# Patient Record
Sex: FEMALE | Race: WHITE | NOT HISPANIC OR LATINO | Employment: FULL TIME | URBAN - METROPOLITAN AREA
[De-identification: names, ages, dates, MRNs, and addresses within clinical notes are randomized per-mention and may not be internally consistent; named-entity substitution may affect disease eponyms.]

---

## 2019-04-10 LAB — HCV AB SER-ACNC: NEGATIVE

## 2022-08-31 ENCOUNTER — OFFICE VISIT (OUTPATIENT)
Dept: FAMILY MEDICINE CLINIC | Facility: CLINIC | Age: 62
End: 2022-08-31
Payer: COMMERCIAL

## 2022-08-31 ENCOUNTER — TELEPHONE (OUTPATIENT)
Dept: ADMINISTRATIVE | Facility: OTHER | Age: 62
End: 2022-08-31

## 2022-08-31 VITALS
HEART RATE: 75 BPM | WEIGHT: 159 LBS | BODY MASS INDEX: 28.17 KG/M2 | TEMPERATURE: 98.2 F | OXYGEN SATURATION: 98 % | DIASTOLIC BLOOD PRESSURE: 74 MMHG | SYSTOLIC BLOOD PRESSURE: 120 MMHG | HEIGHT: 63 IN

## 2022-08-31 DIAGNOSIS — M79.671 RIGHT FOOT PAIN: Primary | ICD-10-CM

## 2022-08-31 DIAGNOSIS — M77.41 METATARSALGIA, RIGHT FOOT: ICD-10-CM

## 2022-08-31 PROCEDURE — MEDROL 4MG MEDROL 4MG: Performed by: STUDENT IN AN ORGANIZED HEALTH CARE EDUCATION/TRAINING PROGRAM

## 2022-08-31 PROCEDURE — 99203 OFFICE O/P NEW LOW 30 MIN: CPT | Performed by: STUDENT IN AN ORGANIZED HEALTH CARE EDUCATION/TRAINING PROGRAM

## 2022-08-31 RX ORDER — ZOLPIDEM TARTRATE 10 MG/1
10 TABLET ORAL
COMMUNITY
Start: 2011-06-20

## 2022-08-31 RX ORDER — ALBUTEROL SULFATE 90 UG/1
AEROSOL, METERED RESPIRATORY (INHALATION) AS NEEDED
COMMUNITY
Start: 2022-07-21

## 2022-08-31 RX ORDER — METHYLPREDNISOLONE 4 MG/1
TABLET ORAL
Qty: 21 EACH | Refills: 0
Start: 2022-08-31

## 2022-08-31 RX ORDER — MOMETASONE FUROATE AND FORMOTEROL FUMARATE DIHYDRATE 200; 5 UG/1; UG/1
AEROSOL RESPIRATORY (INHALATION) 2 TIMES DAILY
COMMUNITY
Start: 2022-08-29

## 2022-08-31 RX ORDER — DEXTROAMPHETAMINE SACCHARATE, AMPHETAMINE ASPARTATE MONOHYDRATE, DEXTROAMPHETAMINE SULFATE AND AMPHETAMINE SULFATE 2.5; 2.5; 2.5; 2.5 MG/1; MG/1; MG/1; MG/1
10 CAPSULE, EXTENDED RELEASE ORAL DAILY
COMMUNITY
Start: 2022-08-01

## 2022-08-31 RX ORDER — BUPROPION HYDROCHLORIDE 150 MG/1
150 TABLET ORAL DAILY
COMMUNITY
Start: 2022-08-15

## 2022-08-31 RX ORDER — MONTELUKAST SODIUM 10 MG/1
10 TABLET ORAL
COMMUNITY
Start: 2022-08-02

## 2022-08-31 RX ORDER — LEVOTHYROXINE SODIUM 112 UG/1
1 TABLET ORAL DAILY
COMMUNITY
Start: 2022-05-06 | End: 2023-05-06

## 2022-08-31 NOTE — TELEPHONE ENCOUNTER
----- Message from Elizabeth Fernandez, [address] sent at 8/31/2022 10:23 AM EDT -----  Regarding: Care Gap request  08/31/22 10:23 AM    Hello, our patient attached above has had Mammogram completed/performed  Please assist in updating the patient chart by pulling the Care Everywhere (CE) document  The date of service is 2020         Thank you,  Elizabeth Fernandez, [address] [facility]

## 2022-08-31 NOTE — PATIENT INSTRUCTIONS
Please take otc advil for pain  You may use up to 800 mg every 6-8 hrs  You may ice the affected area  Wear shoes with a wide toe box and soft inserts to offload pressure

## 2022-08-31 NOTE — TELEPHONE ENCOUNTER
Upon review of the In Basket request we were able to locate, review, and update the patient chart as requested for Hepatitis C  and Mammogram     Any additional questions or concerns should be emailed to the Practice Liaisons via [email or web address] email, please do not reply via In Basket      Thank you  Falguni Segura

## 2022-08-31 NOTE — PROGRESS NOTES
Assessment/Plan:    No problem-specific Assessment & Plan notes found for this encounter  Diagnoses and all orders for this visit:    Right foot pain  -     XR foot 3+ vw right; Future  -     methylPREDNISolone 4 MG tablet therapy pack; Use as directed on package    Metatarsalgia, right foot  -     XR foot 3+ vw right; Future  -     methylPREDNISolone 4 MG tablet therapy pack; Use as directed on package    Other orders  -     triamcinolone (KENALOG) 0 1 % ointment; as needed  -     montelukast (SINGULAIR) 10 mg tablet; Take 10 mg by mouth daily at bedtime  -     Dulera 200-5 MCG/ACT inhaler; 2 (two) times a day  -     buPROPion (WELLBUTRIN XL) 150 mg 24 hr tablet; Take 150 mg by mouth daily  -     albuterol (PROVENTIL HFA,VENTOLIN HFA) 90 mcg/act inhaler; if needed          Subjective:      Patient ID: Pawel Hernandez is a 65 y o  female  HPI    The following portions of the patient's history were reviewed and updated as appropriate: allergies, current medications, past family history, past medical history, past social history, past surgical history and problem list     Patient is a 20-year-old female new patient presenting for acute visit today  Patient has a complaint of right foot pain  This is a new problem that started yesterday per patient  She states that she has had no trauma to the foot and no known twisting movements of the foot that could set off this pain  Pain is located mainly at the 5th metatarsal with some overlap into the area of the 4th metatarsal   Pain was gradual in onset  She states that it began as a aching pain in progress to sharp  She has taken over-the-counter ibuprofen which helped some of the pain  She states that her main complaint is trouble putting pressure on the foot when walking as this is the main aggravator  She denies any numbness or tingling in the affected area  She has not noticed any bruising or other skin changes    No pain along the heel and no increased or different activity  No other reported symptoms in the right foot  Review of Systems   Respiratory: Negative for shortness of breath  Cardiovascular: Negative for chest pain  Gastrointestinal: Negative for abdominal pain, constipation, diarrhea, nausea and vomiting  Genitourinary: Negative for difficulty urinating  Musculoskeletal: Positive for arthralgias and gait problem  Foot pain   Skin: Negative for rash  Neurological: Negative for weakness and numbness  Objective:      /74 (BP Location: Left arm, Patient Position: Sitting, Cuff Size: Large)   Pulse 75   Temp 98 2 °F (36 8 °C)   Ht 5' 3" (1 6 m)   Wt 72 1 kg (159 lb)   SpO2 98%   BMI 28 17 kg/m²          Physical Exam  Vitals and nursing note reviewed  Constitutional:       General: She is not in acute distress  Appearance: Normal appearance  HENT:      Head: Normocephalic and atraumatic  Right Ear: Tympanic membrane, ear canal and external ear normal       Left Ear: Tympanic membrane, ear canal and external ear normal       Nose: Nose normal       Mouth/Throat:      Mouth: Mucous membranes are moist       Pharynx: Oropharynx is clear  Eyes:      Extraocular Movements: Extraocular movements intact  Conjunctiva/sclera: Conjunctivae normal       Pupils: Pupils are equal, round, and reactive to light  Cardiovascular:      Rate and Rhythm: Normal rate and regular rhythm  Pulses:           Dorsalis pedis pulses are 2+ on the right side and 2+ on the left side  Posterior tibial pulses are 2+ on the right side and 2+ on the left side  Heart sounds: Normal heart sounds  No murmur heard  No friction rub  No gallop  Pulmonary:      Effort: Pulmonary effort is normal  No respiratory distress  Breath sounds: Normal breath sounds  No wheezing  Abdominal:      General: Abdomen is flat  Bowel sounds are normal       Palpations: Abdomen is soft  Tenderness:  There is no abdominal tenderness  Musculoskeletal:         General: Tenderness present  Normal range of motion  Cervical back: Normal range of motion and neck supple  Right foot: Normal range of motion  No deformity, bunion, foot drop or prominent metatarsal heads  Left foot: Normal range of motion  No deformity, bunion, foot drop or prominent metatarsal heads  Feet:       Comments: Right    Feet:      Right foot:      Skin integrity: Skin integrity normal       Left foot:      Skin integrity: Skin integrity normal    Lymphadenopathy:      Cervical: No cervical adenopathy  Skin:     General: Skin is warm and dry  Findings: No erythema or rash  Neurological:      General: No focal deficit present  Mental Status: She is alert and oriented to person, place, and time  Mental status is at baseline  Psychiatric:         Mood and Affect: Mood normal          Behavior: Behavior normal          Thought Content:  Thought content normal          Judgment: Judgment normal

## 2023-11-16 PROBLEM — K11.7 XEROSTOMIA: Status: ACTIVE | Noted: 2018-01-12

## 2023-11-16 PROBLEM — L20.82 FLEXURAL ECZEMA: Status: ACTIVE | Noted: 2020-09-29

## 2023-11-16 PROBLEM — N93.9 ABNORMAL UTERINE BLEEDING (AUB): Status: ACTIVE | Noted: 2017-02-03

## 2023-11-16 PROBLEM — G47.33 OBSTRUCTIVE SLEEP APNEA SYNDROME: Status: ACTIVE | Noted: 2020-08-04

## 2023-11-16 PROBLEM — J45.30 MILD PERSISTENT ASTHMA: Status: ACTIVE | Noted: 2020-08-04

## 2023-11-16 PROBLEM — K21.9 GASTROESOPHAGEAL REFLUX DISEASE: Status: ACTIVE | Noted: 2017-07-18

## 2023-11-16 PROBLEM — J45.20 MILD INTERMITTENT ASTHMA: Status: ACTIVE | Noted: 2020-08-04

## 2023-12-05 NOTE — PROGRESS NOTES
ADULT ANNUAL [address] [facility] [address] IN PARTNERSHIP WITH [facility]S    NAME: Chantale Patel  AGE: 59 y.o. SEX: female  : 1960     DATE: 12/15/2023     Assessment and Plan:     Problem List Items Addressed This Visit       Anxiety     She is currently off Wellbutrin. She does have reported anxiety at nighttime and mind racing which plays into her sleep. We will trial Vistaril as needed for sleep. She may adjust the dosage if she feels she needs more less of the medication         Attention deficit hyperactivity disorder (ADHD), predominantly inattentive type     Patient currently takes 20 mg of Adderall daily. Previous PCP was writing this medication for her. We will do a drug contract and drug screen today to take over management. Relevant Medications    amphetamine-dextroamphetamine (ADDERALL XR, 20MG,) 20 MG 24 hr capsule    hydrOXYzine HCL (ATARAX) 25 mg tablet    Depression     Chronic and stable. Relevant Medications    amphetamine-dextroamphetamine (ADDERALL XR, 20MG,) 20 MG 24 hr capsule    hydrOXYzine HCL (ATARAX) 25 mg tablet    Hypothyroidism     Patient states that she is currently on 112 mcg of levothyroxine. She takes this medication daily. Her thyroid levels fluctuate from the high threes up to 8. She states that she has not been able to have it consistently controlled. She has excess fatigue and hot flashes and other symptoms such as memory brain fog. She thinks this may be related to her thyroid. I will check a TSH and T4 along with T3 levels. Relevant Orders    TSH, 3rd generation with Free T4 reflex    T3, free    Hyperlipidemia     Recheck cholesterol levels today. Relevant Orders    CBC and differential    Comprehensive metabolic panel    Lipid panel    POCT hemoglobin A1c    Mild intermittent asthma     Patient is on Dulera and Singulair. Chronic and stable. Obstructive sleep apnea syndrome     Patient currently not using CPAP. She was evaluated for [facility] system but does not qualify. Pruritic rash     There is a pruritic rash noted to bilateral lower extremities. It is possible that this may be an eczema outbreak but patient has been scratching at it significantly. There is scabbing noted around the affected areas. Patient states that this will resolve after being on oral steroids for respiratory problems. I will prescribe her a betamethasone cream that she can start using on the extremities. Relevant Medications    betamethasone dipropionate (DIPROSONE) 0.05 % cream    Right wrist pain     Patient was recently seen by orthopedic specialist and diagnosed with right wrist tendinitis. Symptoms are improving with conservative means and anti-inflammatories. Continue meloxicam or ibuprofen as needed. Patient states that she was taking meloxicam for bad flareups of the pain. Primary insomnia     Patient states that she has trouble sleeping due to mind racing at night and anxiety. She has tried taking Ambien 1-1/2 to 1 tablet of 10 mg at bedtime as needed. This helps with sleep but she is open to trying different medication that is not controlled or habit-forming. We will trial Vistaril 25 mg at nighttime as needed. I will reassess patient at her next follow-up. Relevant Medications    hydrOXYzine HCL (ATARAX) 25 mg tablet    Chronic fatigue     Check B12 level today with folate         Relevant Orders    Vitamin B12/Folate, Serum Panel    Elevated BP without diagnosis of hypertension     Other Visit Diagnoses       Annual physical exam    -  Primary    Relevant Orders    CBC and differential    Comprehensive metabolic panel    Lipid panel    POCT hemoglobin A1c    Screening mammogram for breast cancer        Follows with gyn.  Has order for this    Cervical cancer screening        Up to date    Encounter for vaccination Colon cancer screening        Obtain records from [facility]. Long term use of drug        Relevant Orders    Drug Monitoring, Panel 1, with Confirmation, Urine    Encounter for immunization        Relevant Orders    influenza vaccine, quadrivalent, 0.5 mL, preservative-free, for adult and pediatric patients 6 mos+ (AFLURIA, FLUARIX, FLULAVAL, FLUZONE) (Completed)    Memory change        Will recheck labs as above    Hot flashes        Recommend following up with GYN for possible hormonal evaluation. Her mixture of symptoms could be a hormonal problem. Relevant Medications    betamethasone dipropionate (DIPROSONE) 0.05 % cream            Immunizations and preventive care screenings were discussed with patient today. Appropriate education was printed on patient's after visit summary. Counseling:  Alcohol/drug use: discussed moderation in alcohol intake, the recommendations for healthy alcohol use, and avoidance of illicit drug use. Dental Health: discussed importance of regular tooth brushing, flossing, and dental visits. Exercise: the importance of regular exercise/physical activity was discussed. Recommend exercise 3-5 times per week for at least 30 minutes. BMI Counseling: Body mass index is 29.55 kg/m². The BMI is above normal. Nutrition recommendations include encouraging healthy choices of fruits and vegetables. Exercise recommendations include moderate physical activity 150 minutes/week. Rationale for BMI follow-up plan is due to patient being overweight or obese. Patient was seen today for an annual physical exam. Age appropriate screening tests were done as above. Annual labs were ordered to be done through [facility]. Patient will be contacted regarding results and follow up will be based on findings. Patient was counseled on the importance of proper diet and exercise. I recommend diets rich in lean meats and leafy green vegetables.  Try to have 150 minutes of exercise weekly at moderate intensity. See problem based charting for any chronic problems or new findings and current workup/management. 50 minutes were spent on chart review, examination, and plan of care. This note was dictated using software. Return in about 2 months (around 2/15/2024) for [address] office visit long. Chief Complaint:     Chief Complaint   Patient presents with    Physical Exam     Est care  Wrist brace and otc meds have helped with pain. A1c  Fatique and stubborn weight gain  Brain fog   Rash on legs since 2019  Bowels from constipation to loose stools  Waking from night sweats  Hypothyroidism high 8's to high 3's  Chol is high and of concern          History of Present Illness:     Adult Annual Physical   Patient here for a comprehensive physical exam. The patient reports problems - sleep, wrist pain, A1c, rash on legs, bowels and constipation, memoru . Patient states that she has a lot of issues with her sleep. This has been present for many years. Patient feels that a lot of her sleep issues are due to anxiety. She has no trouble getting to sleep but wakes up and     Diet and Physical Activity  Diet/Nutrition:  mixed diet . Exercise: walking. Depression Screening  PHQ-2/9 Depression Screening           General Health  Sleep: sleeps poorly. Hearing: normal - bilateral.  Vision: wears glasses. Dental: regular dental visits. /GYN Health  Follows with gynecology? yes Dr. Yoseph Mendoza of Systems:     Review of Systems   Constitutional:  Positive for chills and fatigue. Hot flashes   Respiratory:  Negative for shortness of breath. Cardiovascular:  Negative for chest pain. Gastrointestinal:  Positive for constipation and diarrhea. Negative for abdominal pain, nausea and vomiting. Genitourinary:  Negative for difficulty urinating. Skin:  Negative for rash. Psychiatric/Behavioral:  Positive for sleep disturbance. The patient is nervous/anxious. Past Medical History:     Past Medical History:   Diagnosis Date    Abnormal uterine bleeding (AUB)     ADHD (attention deficit hyperactivity disorder)     Allergic rhinitis due to pollen     Anemia     Anxiety     Arthritis     Asthma     Colon polyp 2015    Flexural eczema     GERD (gastroesophageal reflux disease)     Hashimoto's thyroiditis     Hypothyroidism     IBS (irritable bowel syndrome)     Insomnia     Multinodular goiter     Obstructive sleep apnea syndrome     Peptic ulcer     Postpartum depression     Xerostomia       Past Surgical History:     Past Surgical History:   Procedure Laterality Date    BREAST EXCISIONAL BIOPSY Left 1984    benign    BREAST SURGERY Left 1983    DILATION AND CURETTAGE OF UTERUS WITH HYSTEROSOCPY      ENDOMETRIAL ABLATION  2005    HIP SURGERY Right 2015    OVARIAN CYST REMOVAL      SALPINGECTOMY Right       Social History:     Social History     Socioeconomic History    Marital status: /Civil Union     Spouse name: None    Number of children: None    Years of education: None    Highest education level: None   Occupational History    None   Tobacco Use    Smoking status: Never    Smokeless tobacco: Never   Vaping Use    Vaping status: Never Used   Substance and Sexual Activity    Alcohol use: Yes    Drug use: None    Sexual activity: None   Other Topics Concern    None   Social History Narrative    None     Social Determinants of Health     Financial Resource Strain: Not on file   Food Insecurity: Not on file   Transportation Needs: Not on file   Physical Activity: Not on file   Stress: Not on file   Social Connections: Not on file   Intimate Partner Violence: Not on file   Housing Stability: Not on file      Family History:     Family History   Problem Relation Age of Onset    Liver cancer Mother     Pancreatic cancer Mother     Heart attack Father     Depression Brother     Heart disease Brother     Breast cancer Maternal Grandmother     Ovarian cancer Maternal Grandmother     Heart attack Paternal Grandfather       Current Medications:     Current Outpatient Medications   Medication Sig Dispense Refill    albuterol (PROVENTIL HFA,VENTOLIN HFA) 90 mcg/act inhaler if needed      amphetamine-dextroamphetamine (ADDERALL XR, 20MG,) 20 MG 24 hr capsule Take 1 capsule (20 mg total) by mouth every morning Max Daily Amount: 20 mg 30 capsule 0    betamethasone dipropionate (DIPROSONE) 0.05 % cream Apply topically 2 (two) times a day 45 g 2    Dulera 200-5 MCG/ACT inhaler 2 (two) times a day      hydrOXYzine HCL (ATARAX) 25 mg tablet Take 1 tablet (25 mg total) by mouth daily at bedtime as needed for anxiety 30 tablet 2    montelukast (SINGULAIR) 10 mg tablet Take 10 mg by mouth daily at bedtime      Ospemifene 60 MG TABS Take 1 tablet by mouth daily      zolpidem (AMBIEN) 10 mg tablet Take 10 mg by mouth daily at bedtime as needed      levothyroxine 112 mcg tablet Take 1 tablet by mouth daily       No current facility-administered medications for this visit. Allergies: Allergies   Allergen Reactions    Iodine - Food Allergy Rash    Bacitracin Other (See Comments) and Rash     Skin irritation      Physical Exam:     /82 (BP Location: Right arm, Patient Position: Sitting, Cuff Size: Standard)   Pulse 74   Ht 5' 3" (1.6 m)   Wt 75.7 kg (166 lb 12.8 oz)   SpO2 97%   BMI 29.55 kg/m²     Physical Exam  Vitals and nursing note reviewed. Constitutional:       General: She is not in acute distress. Appearance: Normal appearance. She is well-developed. HENT:      Head: Normocephalic and atraumatic. Right Ear: Tympanic membrane, ear canal and external ear normal. There is no impacted cerumen. Left Ear: Tympanic membrane, ear canal and external ear normal. There is no impacted cerumen. Nose: Nose normal. No congestion. Mouth/Throat:      Mouth: Mucous membranes are moist.      Pharynx: No oropharyngeal exudate or posterior oropharyngeal erythema. Eyes:      Extraocular Movements: Extraocular movements intact. Conjunctiva/sclera: Conjunctivae normal.      Pupils: Pupils are equal, round, and reactive to light. Cardiovascular:      Rate and Rhythm: Normal rate and regular rhythm. Heart sounds: Normal heart sounds. No murmur heard. Pulmonary:      Effort: Pulmonary effort is normal. No respiratory distress. Breath sounds: Normal breath sounds. No wheezing. Abdominal:      General: Abdomen is flat. Palpations: Abdomen is soft. Tenderness: There is no abdominal tenderness. There is no guarding. Musculoskeletal:         General: Normal range of motion. Cervical back: Normal range of motion and neck supple. Lymphadenopathy:      Cervical: No cervical adenopathy. Skin:     General: Skin is warm and dry. Findings: Rash (bilateral lower extremity rash, reddish, scale; open areas from scratching noted.) present. Neurological:      General: No focal deficit present. Mental Status: She is alert and oriented to person, place, and time. Mental status is at baseline. Psychiatric:         Mood and Affect: Mood normal.         Behavior: Behavior normal.         Thought Content:  Thought content normal.         Judgment: Judgment normal.          Clent Part, DO  [address] [facility]

## 2023-12-05 NOTE — ASSESSMENT & PLAN NOTE
She is currently off Wellbutrin. She does have reported anxiety at nighttime and mind racing which plays into her sleep. We will trial Vistaril as needed for sleep.   She may adjust the dosage if she feels she needs more less of the medication

## 2023-12-05 NOTE — ASSESSMENT & PLAN NOTE
Patient currently takes 20 mg of Adderall daily. Previous PCP was writing this medication for her. We will do a drug contract and drug screen today to take over management.

## 2023-12-13 ENCOUNTER — TELEPHONE (OUTPATIENT)
Dept: ADMINISTRATIVE | Facility: OTHER | Age: 63
End: 2023-12-13

## 2023-12-13 RX ORDER — DOXEPIN HYDROCHLORIDE 10 MG/1
CAPSULE ORAL
COMMUNITY
Start: 2023-09-01 | End: 2023-12-15 | Stop reason: ALTCHOICE

## 2023-12-13 NOTE — TELEPHONE ENCOUNTER
----- Message from Dari Valdivia sent at 12/13/2023 10:19 AM EST -----  Regarding: care gap request  01/04/23 1:48 PM    Hello, our patient attached above Pap Smear (HPV) aka Cervical Cancer Screening completed/performed. Please assist in updating the patient chart by pulling the Care Everywhere (CE) document. The date of service is 3/16/2022 scanned into Media tab on 12/13/2023.      Thank you,  Dari Valdivia  [address] [facility]

## 2023-12-13 NOTE — TELEPHONE ENCOUNTER
----- Message from Tai Nance sent at 12/13/2023 10:22 AM EST -----  Regarding: care gap request  01/04/23 1:48 PM    Hello, our patient attached above Mammogram completed/performed. Please assist in updating the patient chart by pulling the Care Everywhere (CE) document. The date of service is 2/16/23.      Thank you,  Tai Nance  [address] [facility]

## 2023-12-15 ENCOUNTER — OFFICE VISIT (OUTPATIENT)
Dept: FAMILY MEDICINE CLINIC | Facility: CLINIC | Age: 63
End: 2023-12-15

## 2023-12-15 VITALS
HEIGHT: 63 IN | BODY MASS INDEX: 29.55 KG/M2 | OXYGEN SATURATION: 97 % | HEART RATE: 74 BPM | DIASTOLIC BLOOD PRESSURE: 82 MMHG | WEIGHT: 166.8 LBS | SYSTOLIC BLOOD PRESSURE: 140 MMHG

## 2023-12-15 DIAGNOSIS — R53.82 CHRONIC FATIGUE: ICD-10-CM

## 2023-12-15 DIAGNOSIS — R03.0 ELEVATED BP WITHOUT DIAGNOSIS OF HYPERTENSION: ICD-10-CM

## 2023-12-15 DIAGNOSIS — F41.9 ANXIETY: ICD-10-CM

## 2023-12-15 DIAGNOSIS — R23.2 HOT FLASHES: ICD-10-CM

## 2023-12-15 DIAGNOSIS — R41.3 MEMORY CHANGE: ICD-10-CM

## 2023-12-15 DIAGNOSIS — Z23 ENCOUNTER FOR VACCINATION: ICD-10-CM

## 2023-12-15 DIAGNOSIS — G47.33 OBSTRUCTIVE SLEEP APNEA SYNDROME: ICD-10-CM

## 2023-12-15 DIAGNOSIS — Z12.4 CERVICAL CANCER SCREENING: ICD-10-CM

## 2023-12-15 DIAGNOSIS — F32.A DEPRESSION, UNSPECIFIED DEPRESSION TYPE: ICD-10-CM

## 2023-12-15 DIAGNOSIS — F90.0 ATTENTION DEFICIT HYPERACTIVITY DISORDER (ADHD), PREDOMINANTLY INATTENTIVE TYPE: ICD-10-CM

## 2023-12-15 DIAGNOSIS — L28.2 PRURITIC RASH: ICD-10-CM

## 2023-12-15 DIAGNOSIS — Z79.899 LONG TERM USE OF DRUG: ICD-10-CM

## 2023-12-15 DIAGNOSIS — F51.01 PRIMARY INSOMNIA: ICD-10-CM

## 2023-12-15 DIAGNOSIS — Z12.31 SCREENING MAMMOGRAM FOR BREAST CANCER: ICD-10-CM

## 2023-12-15 DIAGNOSIS — J45.20 MILD INTERMITTENT ASTHMA, UNSPECIFIED WHETHER COMPLICATED: ICD-10-CM

## 2023-12-15 DIAGNOSIS — M25.531 RIGHT WRIST PAIN: ICD-10-CM

## 2023-12-15 DIAGNOSIS — Z00.00 ANNUAL PHYSICAL EXAM: Primary | ICD-10-CM

## 2023-12-15 DIAGNOSIS — Z12.11 COLON CANCER SCREENING: ICD-10-CM

## 2023-12-15 DIAGNOSIS — E03.9 HYPOTHYROIDISM, UNSPECIFIED TYPE: ICD-10-CM

## 2023-12-15 DIAGNOSIS — E78.5 HYPERLIPIDEMIA, UNSPECIFIED HYPERLIPIDEMIA TYPE: ICD-10-CM

## 2023-12-15 DIAGNOSIS — Z23 ENCOUNTER FOR IMMUNIZATION: ICD-10-CM

## 2023-12-15 LAB — SL AMB POCT HEMOGLOBIN AIC: 5.3 (ref ?–6.5)

## 2023-12-15 PROCEDURE — 83036 HEMOGLOBIN GLYCOSYLATED A1C: CPT | Performed by: STUDENT IN AN ORGANIZED HEALTH CARE EDUCATION/TRAINING PROGRAM

## 2023-12-15 PROCEDURE — 99215 OFFICE O/P EST HI 40 MIN: CPT | Performed by: STUDENT IN AN ORGANIZED HEALTH CARE EDUCATION/TRAINING PROGRAM

## 2023-12-15 PROCEDURE — 90471 IMMUNIZATION ADMIN: CPT

## 2023-12-15 PROCEDURE — 99396 PREV VISIT EST AGE 40-64: CPT | Performed by: STUDENT IN AN ORGANIZED HEALTH CARE EDUCATION/TRAINING PROGRAM

## 2023-12-15 PROCEDURE — 90686 IIV4 VACC NO PRSV 0.5 ML IM: CPT

## 2023-12-15 RX ORDER — BETAMETHASONE DIPROPIONATE 0.5 MG/G
CREAM TOPICAL 2 TIMES DAILY
Qty: 45 G | Refills: 2 | Status: SHIPPED | OUTPATIENT
Start: 2023-12-15

## 2023-12-15 RX ORDER — DEXTROAMPHETAMINE SACCHARATE, AMPHETAMINE ASPARTATE MONOHYDRATE, DEXTROAMPHETAMINE SULFATE AND AMPHETAMINE SULFATE 5; 5; 5; 5 MG/1; MG/1; MG/1; MG/1
20 CAPSULE, EXTENDED RELEASE ORAL EVERY MORNING
Qty: 30 CAPSULE | Refills: 0
Start: 2023-12-15

## 2023-12-15 RX ORDER — HYDROXYZINE HYDROCHLORIDE 25 MG/1
25 TABLET, FILM COATED ORAL
Qty: 30 TABLET | Refills: 2 | Status: SHIPPED | OUTPATIENT
Start: 2023-12-15

## 2023-12-15 NOTE — ASSESSMENT & PLAN NOTE
Patient was recently seen by orthopedic specialist and diagnosed with right wrist tendinitis. Symptoms are improving with conservative means and anti-inflammatories. Continue meloxicam or ibuprofen as needed. Patient states that she was taking meloxicam for bad flareups of the pain.

## 2023-12-15 NOTE — ASSESSMENT & PLAN NOTE
Patient states that she is currently on 112 mcg of levothyroxine. She takes this medication daily. Her thyroid levels fluctuate from the high threes up to 8. She states that she has not been able to have it consistently controlled. She has excess fatigue and hot flashes and other symptoms such as memory brain fog. She thinks this may be related to her thyroid. I will check a TSH and T4 along with T3 levels.

## 2023-12-15 NOTE — ASSESSMENT & PLAN NOTE
Patient states that she has trouble sleeping due to mind racing at night and anxiety. She has tried taking Ambien 1-1/2 to 1 tablet of 10 mg at bedtime as needed. This helps with sleep but she is open to trying different medication that is not controlled or habit-forming. We will trial Vistaril 25 mg at nighttime as needed. I will reassess patient at her next follow-up.

## 2023-12-15 NOTE — ASSESSMENT & PLAN NOTE
There is a pruritic rash noted to bilateral lower extremities. It is possible that this may be an eczema outbreak but patient has been scratching at it significantly. There is scabbing noted around the affected areas. Patient states that this will resolve after being on oral steroids for respiratory problems. I will prescribe her a betamethasone cream that she can start using on the extremities.

## 2023-12-15 NOTE — TELEPHONE ENCOUNTER
Upon review of the In Basket request we were able to locate, review, and update the patient chart as requested for Mammogram and Pap Smear (HPV) aka Cervical Cancer Screening. Any additional questions or concerns should be emailed to the Practice Liaisons via the appropriate education email address, please do not reply via In Basket.     Thank you  Nicole Frankel

## 2024-01-11 ENCOUNTER — VBI (OUTPATIENT)
Dept: ADMINISTRATIVE | Facility: OTHER | Age: 64
End: 2024-01-11

## 2024-01-11 DIAGNOSIS — F90.0 ATTENTION DEFICIT HYPERACTIVITY DISORDER (ADHD), PREDOMINANTLY INATTENTIVE TYPE: ICD-10-CM

## 2024-01-11 RX ORDER — DEXTROAMPHETAMINE SACCHARATE, AMPHETAMINE ASPARTATE MONOHYDRATE, DEXTROAMPHETAMINE SULFATE AND AMPHETAMINE SULFATE 5; 5; 5; 5 MG/1; MG/1; MG/1; MG/1
20 CAPSULE, EXTENDED RELEASE ORAL EVERY MORNING
Qty: 30 CAPSULE | Refills: 0 | Status: SHIPPED | OUTPATIENT
Start: 2024-01-11

## 2024-01-12 NOTE — TELEPHONE ENCOUNTER
Upon review of the In Basket request we were able to locate, review, and update the patient chart as requested for CRC: Colonoscopy.    Any additional questions or concerns should be emailed to the Practice Liaisons via the appropriate education email address, please do not reply via In Basket.    Thank you  Nerissa White

## 2024-02-12 DIAGNOSIS — F90.0 ATTENTION DEFICIT HYPERACTIVITY DISORDER (ADHD), PREDOMINANTLY INATTENTIVE TYPE: ICD-10-CM

## 2024-02-12 RX ORDER — DEXTROAMPHETAMINE SACCHARATE, AMPHETAMINE ASPARTATE MONOHYDRATE, DEXTROAMPHETAMINE SULFATE AND AMPHETAMINE SULFATE 5; 5; 5; 5 MG/1; MG/1; MG/1; MG/1
20 CAPSULE, EXTENDED RELEASE ORAL EVERY MORNING
Qty: 30 CAPSULE | Refills: 0 | Status: SHIPPED | OUTPATIENT
Start: 2024-02-12

## 2024-03-11 DIAGNOSIS — F90.0 ATTENTION DEFICIT HYPERACTIVITY DISORDER (ADHD), PREDOMINANTLY INATTENTIVE TYPE: ICD-10-CM

## 2024-03-11 RX ORDER — DEXTROAMPHETAMINE SACCHARATE, AMPHETAMINE ASPARTATE MONOHYDRATE, DEXTROAMPHETAMINE SULFATE AND AMPHETAMINE SULFATE 5; 5; 5; 5 MG/1; MG/1; MG/1; MG/1
20 CAPSULE, EXTENDED RELEASE ORAL EVERY MORNING
Qty: 30 CAPSULE | Refills: 0 | Status: SHIPPED | OUTPATIENT
Start: 2024-03-11

## 2024-03-19 LAB
ALBUMIN SERPL-MCNC: 4.4 G/DL (ref 3.6–5.1)
ALBUMIN/GLOB SERPL: 1.4 (CALC) (ref 1–2.5)
ALP SERPL-CCNC: 88 U/L (ref 37–153)
ALT SERPL-CCNC: 14 U/L (ref 6–29)
AMPHETAMINES UR QL: NEGATIVE NG/ML
AST SERPL-CCNC: 16 U/L (ref 10–35)
BARBITURATES UR QL: NEGATIVE NG/ML
BASOPHILS # BLD AUTO: 41 CELLS/UL (ref 0–200)
BASOPHILS NFR BLD AUTO: 0.6 %
BENZODIAZ UR QL: NEGATIVE NG/ML
BILIRUB SERPL-MCNC: 0.5 MG/DL (ref 0.2–1.2)
BUN SERPL-MCNC: 18 MG/DL (ref 7–25)
BUN/CREAT SERPL: NORMAL (CALC) (ref 6–22)
BZE UR QL: NEGATIVE NG/ML
CALCIUM SERPL-MCNC: 9.4 MG/DL (ref 8.6–10.4)
CHLORIDE SERPL-SCNC: 101 MMOL/L (ref 98–110)
CHOLEST SERPL-MCNC: 297 MG/DL
CHOLEST/HDLC SERPL: 3.2 (CALC)
CO2 SERPL-SCNC: 28 MMOL/L (ref 20–32)
CREAT SERPL-MCNC: 0.74 MG/DL (ref 0.5–1.05)
CREAT UR-MCNC: 12.8 MG/DL
EOSINOPHIL # BLD AUTO: 122 CELLS/UL (ref 15–500)
EOSINOPHIL NFR BLD AUTO: 1.8 %
ERYTHROCYTE [DISTWIDTH] IN BLOOD BY AUTOMATED COUNT: 11.9 % (ref 11–15)
FOLATE SERPL-MCNC: 10 NG/ML
GFR/BSA.PRED SERPLBLD CYS-BASED-ARV: 91 ML/MIN/1.73M2
GLOBULIN SER CALC-MCNC: 3.2 G/DL (CALC) (ref 1.9–3.7)
GLUCOSE SERPL-MCNC: 96 MG/DL (ref 65–99)
HCT VFR BLD AUTO: 40.9 % (ref 35–45)
HDLC SERPL-MCNC: 92 MG/DL
HGB BLD-MCNC: 14.1 G/DL (ref 11.7–15.5)
LDLC SERPL CALC-MCNC: 184 MG/DL (CALC)
LYMPHOCYTES # BLD AUTO: 1924 CELLS/UL (ref 850–3900)
LYMPHOCYTES NFR BLD AUTO: 28.3 %
MCH RBC QN AUTO: 33.3 PG (ref 27–33)
MCHC RBC AUTO-ENTMCNC: 34.5 G/DL (ref 32–36)
MCV RBC AUTO: 96.7 FL (ref 80–100)
METHADONE UR QL: NEGATIVE NG/ML
MONOCYTES # BLD AUTO: 326 CELLS/UL (ref 200–950)
MONOCYTES NFR BLD AUTO: 4.8 %
NEUTROPHILS # BLD AUTO: 4386 CELLS/UL (ref 1500–7800)
NEUTROPHILS NFR BLD AUTO: 64.5 %
NONHDLC SERPL-MCNC: 205 MG/DL (CALC)
OPIATES UR QL: NEGATIVE NG/ML
OXIDANTS UR QL: NEGATIVE MCG/ML
OXYCODONE UR QL: NEGATIVE NG/ML
PCP UR QL: NEGATIVE NG/ML
PH UR: 6.5 [PH] (ref 4.5–9)
PLATELET # BLD AUTO: 398 THOUSAND/UL (ref 140–400)
PMV BLD REES-ECKER: 9.7 FL (ref 7.5–12.5)
POTASSIUM SERPL-SCNC: 4.4 MMOL/L (ref 3.5–5.3)
PROT SERPL-MCNC: 7.6 G/DL (ref 6.1–8.1)
RBC # BLD AUTO: 4.23 MILLION/UL (ref 3.8–5.1)
SODIUM SERPL-SCNC: 139 MMOL/L (ref 135–146)
SP GR UR: 1
T3FREE SERPL-MCNC: 3.3 PG/ML (ref 2.3–4.2)
THC UR QL: NEGATIVE NG/ML
TRIGL SERPL-MCNC: 93 MG/DL
TSH SERPL-ACNC: 2.34 MIU/L (ref 0.4–4.5)
VIT B12 SERPL-MCNC: 430 PG/ML (ref 200–1100)
WBC # BLD AUTO: 6.8 THOUSAND/UL (ref 3.8–10.8)

## 2024-03-21 DIAGNOSIS — F51.01 PRIMARY INSOMNIA: ICD-10-CM

## 2024-03-22 RX ORDER — HYDROXYZINE HYDROCHLORIDE 25 MG/1
TABLET, FILM COATED ORAL
Qty: 30 TABLET | Refills: 2 | Status: SHIPPED | OUTPATIENT
Start: 2024-03-22

## 2024-03-24 NOTE — ASSESSMENT & PLAN NOTE
Patient was trialed on Vistaril for her sleep.  She does have sleep apnea but does not use her machine.

## 2024-03-24 NOTE — ASSESSMENT & PLAN NOTE
Patient was given betamethasone cream for flareups.  She has had improvement in the skin lesions but continues to have flareups appear.  I will give her triamcinolone as a daily medication and she she will use the betamethasone as needed only for bad flares.  I also recommended using a Cetaphil and petroleum jelly over the areas to trap and moisture.

## 2024-03-24 NOTE — ASSESSMENT & PLAN NOTE
Last lab work showed levothyroxine is stable on current dosage.  I will have patient follow-up in 3 months and we will recheck TSH again as her levels have fluctuated greatly in the past

## 2024-03-24 NOTE — PROGRESS NOTES
Assessment/Plan:    Primary insomnia  Patient was trialed on Vistaril for her sleep.  She does have sleep apnea but does not use her machine.    Attention deficit hyperactivity disorder (ADHD), predominantly inattentive type  Chronic and stable.  Continue Adderall 20 mg.    Pruritic rash  Patient was given betamethasone cream for flareups.  She has had improvement in the skin lesions but continues to have flareups appear.  I will give her triamcinolone as a daily medication and she she will use the betamethasone as needed only for bad flares.  I also recommended using a Cetaphil and petroleum jelly over the areas to trap and moisture.    Hypothyroidism  Last lab work showed levothyroxine is stable on current dosage.  I will have patient follow-up in 3 months and we will recheck TSH again as her levels have fluctuated greatly in the past    Anxiety  Patient has had worsening anxiety and depression symptoms as she is having some troubles with her job.  She is having her duties extended considerably for next year and she does not feel that she can handle the new work duties that she will be given.  This is contributing to some trouble with her sleep and does not feel that medication is necessary but would like to speak with our counselor.  I will refer patient over to behavioral health counseling    Depression  Patient has had worsening anxiety and depression symptoms as she is having some troubles with her job.  She is having her duties extended considerably for next year and she does not feel that she can handle the new work duties that she will be given.  This is contributing to some trouble with her sleep and does not feel that medication is necessary but would like to speak with our counselor.  I will refer patient over to behavioral health counseling       Diagnoses and all orders for this visit:    Primary insomnia    Attention deficit hyperactivity disorder (ADHD), predominantly inattentive type    Depression,  unspecified depression type  -     Ambulatory referral to Psych Services; Future    Hypothyroidism, unspecified type  -     levothyroxine 112 mcg tablet; Take 1 tablet (112 mcg total) by mouth daily    Pruritic rash  -     triamcinolone (KENALOG) 0.5 % cream; Apply topically 2 (two) times a day    Anxiety  -     Ambulatory referral to Psych Services; Future    Colon cancer screening  -     Ambulatory referral to Gastroenterology; Future          Patient is a 63-year-old female presenting today for follow-up on multiple complaints.  I will refer patient over to our behavioral health counselor for current stressors that are making anxiety and depression worse.  This is all focused around work so she would like to work on coping strategies to help her through her current tough times.  She would also like to sort out her feelings as whether she should pursue other opportunities.  Patient currently denies any self harming thoughts.  If symptoms start to get worse after counseling patient will consider a daily medication for anxiety or depression.  I will prescribe her triamcinolone cream to be used daily on her rash.  Follow-up in office if symptoms rapidly worsen.  Otherwise we will follow-up for 3-month visit to recheck how counseling is going and recheck TSH level    35 Minutes spent on physical exam and plan of care.  This note was dictated using software.    Subjective:      Patient ID: Brenda James is a 63 y.o. female.    HPI    The following portions of the patient's history were reviewed and updated as appropriate: allergies, current medications, past family history, past medical history, past social history, past surgical history, and problem list.    Pt is a 63-year-old female presenting today for follow-up on multiple concerns.  See problem based charting as above.    Review of Systems   Respiratory:  Negative for shortness of breath.    Cardiovascular:  Negative for chest pain.   Gastrointestinal:  Negative  "for abdominal pain, constipation and diarrhea.   Skin:  Positive for rash.   Psychiatric/Behavioral:  Positive for dysphoric mood and sleep disturbance. Negative for self-injury and suicidal ideas. The patient is nervous/anxious.          Objective:      /68 (BP Location: Left arm, Patient Position: Sitting, Cuff Size: Large)   Pulse 79   Temp (!) 97 °F (36.1 °C)   Ht 5' 3\" (1.6 m)   Wt 75.3 kg (166 lb)   SpO2 99%   BMI 29.41 kg/m²          Physical Exam  Vitals and nursing note reviewed.   Constitutional:       General: She is not in acute distress.     Appearance: Normal appearance.   HENT:      Head: Normocephalic and atraumatic.      Right Ear: External ear normal.      Left Ear: External ear normal.      Nose: Nose normal.      Mouth/Throat:      Mouth: Mucous membranes are moist.      Pharynx: Oropharynx is clear.   Eyes:      Extraocular Movements: Extraocular movements intact.      Conjunctiva/sclera: Conjunctivae normal.      Pupils: Pupils are equal, round, and reactive to light.   Cardiovascular:      Rate and Rhythm: Normal rate and regular rhythm.      Heart sounds: Normal heart sounds. No murmur heard.     No friction rub. No gallop.   Pulmonary:      Effort: Pulmonary effort is normal. No respiratory distress.      Breath sounds: Normal breath sounds. No wheezing.   Musculoskeletal:         General: Normal range of motion.      Cervical back: Normal range of motion.   Skin:     General: Skin is warm and dry.      Findings: No erythema or rash.   Neurological:      General: No focal deficit present.      Mental Status: She is alert and oriented to person, place, and time. Mental status is at baseline.   Psychiatric:         Mood and Affect: Mood normal.         Behavior: Behavior normal.         Thought Content: Thought content normal.         Judgment: Judgment normal.           "

## 2024-03-29 ENCOUNTER — TELEPHONE (OUTPATIENT)
Dept: PSYCHIATRY | Facility: CLINIC | Age: 64
End: 2024-03-29

## 2024-03-29 ENCOUNTER — OFFICE VISIT (OUTPATIENT)
Dept: FAMILY MEDICINE CLINIC | Facility: CLINIC | Age: 64
End: 2024-03-29

## 2024-03-29 VITALS
HEART RATE: 79 BPM | HEIGHT: 63 IN | BODY MASS INDEX: 29.41 KG/M2 | WEIGHT: 166 LBS | DIASTOLIC BLOOD PRESSURE: 68 MMHG | OXYGEN SATURATION: 99 % | TEMPERATURE: 97 F | SYSTOLIC BLOOD PRESSURE: 132 MMHG

## 2024-03-29 DIAGNOSIS — F32.A DEPRESSION, UNSPECIFIED DEPRESSION TYPE: ICD-10-CM

## 2024-03-29 DIAGNOSIS — Z12.11 COLON CANCER SCREENING: ICD-10-CM

## 2024-03-29 DIAGNOSIS — F41.9 ANXIETY: ICD-10-CM

## 2024-03-29 DIAGNOSIS — F51.01 PRIMARY INSOMNIA: Primary | ICD-10-CM

## 2024-03-29 DIAGNOSIS — F90.0 ATTENTION DEFICIT HYPERACTIVITY DISORDER (ADHD), PREDOMINANTLY INATTENTIVE TYPE: ICD-10-CM

## 2024-03-29 DIAGNOSIS — E03.9 HYPOTHYROIDISM, UNSPECIFIED TYPE: ICD-10-CM

## 2024-03-29 DIAGNOSIS — L28.2 PRURITIC RASH: ICD-10-CM

## 2024-03-29 PROCEDURE — 99214 OFFICE O/P EST MOD 30 MIN: CPT | Performed by: STUDENT IN AN ORGANIZED HEALTH CARE EDUCATION/TRAINING PROGRAM

## 2024-03-29 RX ORDER — LEVOTHYROXINE SODIUM 112 UG/1
112 TABLET ORAL DAILY
Qty: 90 TABLET | Refills: 3 | Status: SHIPPED | OUTPATIENT
Start: 2024-03-29 | End: 2026-02-20

## 2024-03-29 RX ORDER — TRIAMCINOLONE ACETONIDE 5 MG/G
CREAM TOPICAL 2 TIMES DAILY
Qty: 30 G | Refills: 2 | Status: SHIPPED | OUTPATIENT
Start: 2024-03-29

## 2024-03-29 NOTE — ASSESSMENT & PLAN NOTE
Patient has had worsening anxiety and depression symptoms as she is having some troubles with her job.  She is having her duties extended considerably for next year and she does not feel that she can handle the new work duties that she will be given.  This is contributing to some trouble with her sleep and does not feel that medication is necessary but would like to speak with our counselor.  I will refer patient over to behavioral health counseling

## 2024-04-08 ENCOUNTER — SOCIAL WORK (OUTPATIENT)
Age: 64
End: 2024-04-08

## 2024-04-08 DIAGNOSIS — F32.A DEPRESSION, UNSPECIFIED DEPRESSION TYPE: ICD-10-CM

## 2024-04-08 DIAGNOSIS — F41.9 ANXIETY: Primary | ICD-10-CM

## 2024-04-08 PROCEDURE — 90834 PSYTX W PT 45 MINUTES: CPT

## 2024-04-08 NOTE — PSYCH
Behavioral Health Psychotherapy Assessment    Date of Initial Psychotherapy Assessment: 04/08/24  Referral Source: Hector Ni DO  Has a release of information been signed for the referral source? Yes    Preferred Name: Brenda James  Preferred Pronouns: She/her  YOB: 1960 Age: 63 y.o.  Sex assigned at birth: female   Gender Identity: female  Race:   Preferred Language: English    Emergency Contact:  Full Name: Doroteo James  Relationship to Client: Spouse  Contact information: [phone]    Primary Care Physician:  Hector Ni DO  [address] [place] [address]  [phone]  Has a release of information been signed? Yes    Physical Health History:  Past surgical procedures: n/a  Do you have a history of any of the following: n/a  Do you have any mobility issues? No    Relevant Family History:  No significant relevant family history reported.      Presenting Problem (What brings you in?)  Patient is struggling with a decision about work that is affecting her mental health    Mental Health Advance Directive:  Do you currently have a Mental Health Advance Directive? no    Diagnosis:   Diagnosis ICD-10-CM Associated Orders   1. Anxiety  F41.9       2. Depression, unspecified depression type  F32.A           Initial Assessment:     Current Mental Status:    Appearance: appropriate      Behavior/Manner: cooperative      Affect/Mood:  Anxious and sad    Speech:  Normal    Oriented to: oriented to self, oriented to place and oriented to time       Clinical Symptoms    Anxiety: yes      Anxiety Symptoms: nervous/anxious      Have you ever been assaultive to others or the environment: No      Have you ever been self-injurious: No      Counseling History:  Previous Counseling or Treatment  (Mental Health or Drug & Alcohol): No    Have you previously taken psychiatric medications: Yes    Previous Medications Attempted:  Patient currently takes medication for ADHD, Adderall, and  "anxiety, Atarax    Suicide Risk Assessment  Have you ever had a suicide attempt: No    Have you had incidents of suicidal ideation: No    Are you currently experiencing suicidal thoughts: No      Disordered Eating History:  Do you have a history of disordered eating: No      Social Determinants of Health:    SDOH:  Stress    Trauma and Abuse History:    Have you ever been abused: No      Legal History:    Have you ever been arrested  or had a DUI: No      Have you been incarcerated: No      Are you currently on parole/probation: No      Any current Children and Youth involvement: No      Any pending legal charges: No      Relationship History:    Current marital status:       Natural Supports:  Friends    Employment History    Are you currently employed: Yes      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 female who presents for an initial therapy session with this provider. Patient is  with children. 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 Provided a safe space for patient to process feelings, review old and discuss new coping skills. Provided supportive therapy to reduce emotional distress, to work on improving self-esteem and self-care and enhance " "coping skills using attentive, reflective and sympathetic listening. Spoke with patient about \"imposter syndrome\" and how she feels like that sometimes as a teacher and now in other parts of her life because of what the school is doing with her schedule.  Discussed how patient can look at it from a different perspective and/or lens and what are her intentions. Patient has intentions to be a good employee/teacher and good person. Worked with patient as to how she can be in the same space as being and doing good with good intentions and still be able to do better and make mistakes. Discussed with patient her thoughts and reflections, provided supportive counseling, and allowed for patient to identify and explore her emotions. Worked with patient to explore her relationships and identify patterns of boundary violations and challenges. Worked collaboratively with patient to develop skills for assertive communication, setting limits, and expressing personal needs. Allowed for patient to identify the guidelines patient has determined for herself that dictate how she wants to be treated and what types of interactions she is willing to accept from others. Worked with patient to help develop boundaries to start to feel less manipulated, violated and mistreated by others. Worked with patient to identify what behaviors from others are acceptable and what behaviors from others might lead to discomfort or distress.       Visit start and stop times:    04/08/24  Start Time: 1400  Stop Time: 1440  Total Visit Time: 40 minutes  "

## 2024-04-11 DIAGNOSIS — F90.0 ATTENTION DEFICIT HYPERACTIVITY DISORDER (ADHD), PREDOMINANTLY INATTENTIVE TYPE: ICD-10-CM

## 2024-04-11 RX ORDER — DEXTROAMPHETAMINE SACCHARATE, AMPHETAMINE ASPARTATE MONOHYDRATE, DEXTROAMPHETAMINE SULFATE AND AMPHETAMINE SULFATE 5; 5; 5; 5 MG/1; MG/1; MG/1; MG/1
20 CAPSULE, EXTENDED RELEASE ORAL EVERY MORNING
Qty: 30 CAPSULE | Refills: 0 | Status: SHIPPED | OUTPATIENT
Start: 2024-04-11

## 2024-04-18 DIAGNOSIS — Z00.6 ENCOUNTER FOR EXAMINATION FOR NORMAL COMPARISON OR CONTROL IN CLINICAL RESEARCH PROGRAM: ICD-10-CM

## 2024-05-13 DIAGNOSIS — F90.0 ATTENTION DEFICIT HYPERACTIVITY DISORDER (ADHD), PREDOMINANTLY INATTENTIVE TYPE: ICD-10-CM

## 2024-05-13 RX ORDER — DEXTROAMPHETAMINE SACCHARATE, AMPHETAMINE ASPARTATE MONOHYDRATE, DEXTROAMPHETAMINE SULFATE AND AMPHETAMINE SULFATE 5; 5; 5; 5 MG/1; MG/1; MG/1; MG/1
20 CAPSULE, EXTENDED RELEASE ORAL EVERY MORNING
Qty: 30 CAPSULE | Refills: 0 | Status: SHIPPED | OUTPATIENT
Start: 2024-05-13

## 2024-05-23 ENCOUNTER — SOCIAL WORK (OUTPATIENT)
Age: 64
End: 2024-05-23

## 2024-05-23 DIAGNOSIS — F41.9 ANXIETY: Primary | ICD-10-CM

## 2024-05-23 DIAGNOSIS — F32.A DEPRESSION, UNSPECIFIED DEPRESSION TYPE: ICD-10-CM

## 2024-05-23 PROCEDURE — 90834 PSYTX W PT 45 MINUTES: CPT

## 2024-05-23 NOTE — BH TREATMENT PLAN
Outpatient Behavioral Health Psychotherapy Treatment Plan    Brenda AGUIRRE Erika  1960     Date of Initial Psychotherapy Assessment: 04/08/24   Date of Current Treatment Plan: 05/23/24  Treatment Plan Target Date: 10/23/24  Treatment Plan Expiration Date: 10/23/24    Diagnosis:   1. Anxiety        2. Depression, unspecified depression type            Area(s) of Need: Anxiety and Depression    Long Term Goal 1 (in the client's own words): Reduce overall frequency, intensity and duration of the anxiety and depression symptoms so that daily functioning is not impaired by continuing to develop coping strategies and identifying/communicating feelings.        Stage of Change: Preparation    Target Date for completion: n/a     Anticipated therapeutic modalities: Cognitive behavioral therapy, supportive psychotherapy, mindfulness/relaxation practices        People identified to complete this goal: Patient, therapist      Objective 1: (identify the means of measuring success in meeting the objective): Patient will be able to demonstrate at least 50% reduction in anxiety and depression symptoms with the use of at least 2-3 learned coping strategies each week and continued talk-therapy, as self-identified and reported          Objective 2: (identify the means of measuring success in meeting the objective): n/a      Long Term Goal 2 (in the client's own words): n/a    Stage of Change: n/a    Target Date for completion: n/a     Anticipated therapeutic modalities: n/a     People identified to complete this goal: n/a      Objective 1: (identify the means of measuring success in meeting the objective): n/a      Objective 2: (identify the means of measuring success in meeting the objective): n/a     Long Term Goal 3 (in the client's own words): n/a    Stage of Change: n/a    Target Date for completion: n/a     Anticipated therapeutic modalities: n/a     People identified to complete this goal: n/a      Objective 1: (identify the means  of measuring success in meeting the objective): n/a      Objective 2: (identify the means of measuring success in meeting the objective): n/a     I am currently under the care of a [facility] psychiatric provider: no    My [facility] psychiatric provider is: n/a    I am currently taking psychiatric medications: Yes, as prescribed    I feel that I will be ready for discharge from mental health care when I reach the following (measurable goal/objective): Goals met    For children and adults who have a legal guardian:   Has there been any change to custody orders and/or guardianship status? NA. If yes, attach updated documentation.    I have created my Crisis Plan and have been offered a copy of this plan    Behavioral Health Treatment Plan [facility]: Diagnosis and Treatment Plan explained to Brenda James acknowledges an understanding of their diagnosis. Brenda James agrees to this treatment plan.    I have been offered a copy of this Treatment Plan. yes

## 2024-05-23 NOTE — PSYCH
"Behavioral Health Psychotherapy Progress Note    Psychotherapy Provided: Individual Psychotherapy     1. Anxiety        2. Depression, unspecified depression type            Goals addressed in session: Goal 1     DATA: Continued to work with patient to identify her core beliefs and work towards cognitive reframing and restructuring. Worked with patient to notice the thoughts that contribute to feelings of anxiety, negativity and/or depression and begin to identify and label these thoughts as distortions. Worked with patient to discover evidence that contradicts her thoughts and find more helpful ways of thinking about the situation that is causing her to feel bad about herself. Allowed for patient to be more aware and accepting of how she feels. Worked with patient to help her draw on her strengths and discover and reflect on things that bring her happiness and peace and the qualities she likes about herself. Worked with patient to interrupt negative thought patterns.  Worked with patient to identify and confront present core beliefs to be able to make changes to them to begin working on confidence and building a strong self-worth. Helped patient develop healthier \"rules\" and beliefs and test negative predictions. Worked with patient to replace self-criticism with self-compassion.      During this session, this clinician used the following therapeutic modalities: Cognitive Behavioral Therapy, Cognitive Processing Therapy, Mindfulness-based Strategies, and Supportive Psychotherapy    Substance Abuse was not addressed during this session. If the client is diagnosed with a co-occurring substance use disorder, please indicate any changes in the frequency or amount of use: n/a. Stage of change for addressing substance use diagnoses: No substance use/Not applicable    ASSESSMENT:  Brenda James presents with a Euthymic/ normal mood.     her affect is Normal range and intensity, which is congruent, with her mood and the " "content of the session. The client has made progress on their goals.     Brenda James presents with a none risk of suicide, none risk of self-harm, and none risk of harm to others.    For any risk assessment that surpasses a \"low\" rating, a safety plan must be developed.    A safety plan was indicated: no  If yes, describe in detail n/a    PLAN: Between sessions, Brenda James will continue to use coping strategies to manage anxiety and depression. At the next session, the therapist will use Cognitive Behavioral Therapy, Cognitive Processing Therapy, Mindfulness-based Strategies, and Supportive Psychotherapy to address anxiety and depression.    Behavioral Health Treatment Plan and Discharge Planning: Brenda James is aware of and agrees to continue to work on their treatment plan. They have identified and are working toward their discharge goals. yes    Visit start and stop times:    05/23/24  Start Time: 1500  Stop Time: 1552  Total Visit Time: 52 minutes  "

## 2024-05-23 NOTE — BH CRISIS PLAN
Client Name: Brenda James       Client YOB: 1960    [facility] Safety Plan    Creation Date: 5/23/24 Update Date: 5/23/25   Created By: Connie Blandon LCSW       Step 1: Warning Signs:   Warning Signs   Overthinking /Spiraling negative thoughts   Heart racing   Feeling overemotional            Step 2: Internal Coping Strategies:   Internal Coping Strategies   Intentional breathing   Writing these down   Mindfulness / grounding techniques   Challenging negative thoughts            Step 3: People and social settings that provide distraction:   Name Contact Information   Doroteo James (spouse) [phone]          Step 4: People whom I can ask for help during a crisis:    Name Contact Information    Doroteo [phone]      Step 5: Professionals or agencies I can contact during a crisis:    Clinican/Agency Name Phone Emergency Contact    [facility] Crisis Line 438     [facility] [phone]     [facility] Emergency Department Emergency Department Phone Emergency Department Address    [facility] [phone] [address] [place] [address]      Crisis Phone Numbers:   Suicide Prevention Lifeline: Call or Text  057 Crisis Text Line: Text HOME to 301-641   Please note: Some [facility] do not have a separate number for Child/Adolescent specific crisis. If your county is not listed under Child/Adolescent, please call the adult number for your county      Adult Crisis Numbers: Child/Adolescent Crisis Numbers   [facility]: [phone] [facility]: [phone]   [facility]: [phone] [facility]: [phone]   [facility]: [phone] [place]: [phone]   [facility]: [phone] [place]: [phone]   [place]: [phone]   [facility]: [phone]   [facility]: [phone]   [facility]: [phone] (daytime) [phone] (after hours, weekends, holidays)      Step 6: Making  the environment safer (plan for lethal means safety):   Patient did not identify any lethal methods: Yes     Optional: What is most important to me and worth living for?   My family  My brother  by suicide and I would never want to do that to my loved ones     [facility] Safety Plan. Ynes Roman and Kendrick Grimm. Used with permission of the authors.

## 2024-06-13 DIAGNOSIS — F90.0 ATTENTION DEFICIT HYPERACTIVITY DISORDER (ADHD), PREDOMINANTLY INATTENTIVE TYPE: ICD-10-CM

## 2024-06-13 RX ORDER — DEXTROAMPHETAMINE SACCHARATE, AMPHETAMINE ASPARTATE MONOHYDRATE, DEXTROAMPHETAMINE SULFATE AND AMPHETAMINE SULFATE 5; 5; 5; 5 MG/1; MG/1; MG/1; MG/1
20 CAPSULE, EXTENDED RELEASE ORAL EVERY MORNING
Qty: 30 CAPSULE | Refills: 0 | Status: SHIPPED | OUTPATIENT
Start: 2024-06-13

## 2024-06-17 ENCOUNTER — SOCIAL WORK (OUTPATIENT)
Age: 64
End: 2024-06-17

## 2024-06-17 DIAGNOSIS — F41.9 ANXIETY: ICD-10-CM

## 2024-06-17 DIAGNOSIS — F32.A DEPRESSION, UNSPECIFIED DEPRESSION TYPE: Primary | ICD-10-CM

## 2024-06-17 PROCEDURE — 90834 PSYTX W PT 45 MINUTES: CPT

## 2024-06-17 NOTE — PSYCH
"Behavioral Health Psychotherapy Progress Note    Psychotherapy Provided: Individual Psychotherapy     1. Depression, unspecified depression type        2. 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 Discussed how patient can look at it from a different perspective and/or lens and what are her intentions. Patient has intentions to be a good parent, a good employee and good person. Worked with patient as to how she can be in the same space as being and doing good with good intentions and still be able to do better and make mistakes. Discussed with patient her thoughts and reflections, provided supportive counseling, and allowed for patient to identify and explore her emotions.  During this session, this clinician used the following therapeutic modalities: Cognitive Behavioral Therapy, Mindfulness-based Strategies, and Supportive Psychotherapy    Substance Abuse was not addressed during this session. If the client is diagnosed with a co-occurring substance use disorder, please indicate any changes in the frequency or amount of use: n/a. Stage of change for addressing substance use diagnoses: No substance use/Not applicable    ASSESSMENT:  Brenda James presents with a Euthymic/ normal mood.     her affect is Normal range and intensity, which is congruent, with her mood and the content of the session. The client has made progress on their goals.     Brenda James presents with a none risk of suicide, none risk of self-harm, and none risk of harm to others.    For any risk assessment that surpasses a \"low\" rating, a safety plan must be developed.    A safety plan was indicated: no  If yes, describe in detail n/a    PLAN: Between sessions, Brenda James will continue to use coping strategies to manage anxiety and depression. At the next session, the therapist will use " Cognitive Behavioral Therapy, Mindfulness-based Strategies, and Supportive Psychotherapy to address anxiety, depression and work-related stress.    Behavioral Health Treatment Plan and Discharge Planning: Brenda James is aware of and agrees to continue to work on their treatment plan. They have identified and are working toward their discharge goals. yes    Visit start and stop times:    06/17/24  Start Time: 1208  Stop Time: 1300  Total Visit Time: 52 minutes

## 2024-07-05 ENCOUNTER — OFFICE VISIT (OUTPATIENT)
Dept: FAMILY MEDICINE CLINIC | Facility: CLINIC | Age: 64
End: 2024-07-05

## 2024-07-05 VITALS
HEIGHT: 63 IN | HEART RATE: 67 BPM | OXYGEN SATURATION: 95 % | DIASTOLIC BLOOD PRESSURE: 76 MMHG | SYSTOLIC BLOOD PRESSURE: 126 MMHG | BODY MASS INDEX: 30.02 KG/M2 | WEIGHT: 169.4 LBS

## 2024-07-05 DIAGNOSIS — F32.A DEPRESSION, UNSPECIFIED DEPRESSION TYPE: ICD-10-CM

## 2024-07-05 DIAGNOSIS — E78.5 HYPERLIPIDEMIA, UNSPECIFIED HYPERLIPIDEMIA TYPE: ICD-10-CM

## 2024-07-05 DIAGNOSIS — Z12.11 COLON CANCER SCREENING: Primary | ICD-10-CM

## 2024-07-05 DIAGNOSIS — E66.9 OBESITY (BMI 30.0-34.9): ICD-10-CM

## 2024-07-05 DIAGNOSIS — F90.0 ATTENTION DEFICIT HYPERACTIVITY DISORDER (ADHD), PREDOMINANTLY INATTENTIVE TYPE: ICD-10-CM

## 2024-07-05 DIAGNOSIS — E03.9 HYPOTHYROIDISM, UNSPECIFIED TYPE: ICD-10-CM

## 2024-07-05 DIAGNOSIS — R03.0 ELEVATED BP WITHOUT DIAGNOSIS OF HYPERTENSION: ICD-10-CM

## 2024-07-05 DIAGNOSIS — F51.01 PRIMARY INSOMNIA: ICD-10-CM

## 2024-07-05 LAB — SL AMB POCT HEMOGLOBIN AIC: 5.5 (ref ?–6.5)

## 2024-07-05 PROCEDURE — 83036 HEMOGLOBIN GLYCOSYLATED A1C: CPT | Performed by: STUDENT IN AN ORGANIZED HEALTH CARE EDUCATION/TRAINING PROGRAM

## 2024-07-05 PROCEDURE — 36415 COLL VENOUS BLD VENIPUNCTURE: CPT | Performed by: STUDENT IN AN ORGANIZED HEALTH CARE EDUCATION/TRAINING PROGRAM

## 2024-07-05 PROCEDURE — 99214 OFFICE O/P EST MOD 30 MIN: CPT | Performed by: STUDENT IN AN ORGANIZED HEALTH CARE EDUCATION/TRAINING PROGRAM

## 2024-07-05 RX ORDER — DOXEPIN HYDROCHLORIDE 10 MG/1
10 CAPSULE ORAL
Qty: 30 CAPSULE | Refills: 5 | Status: SHIPPED | OUTPATIENT
Start: 2024-07-05

## 2024-07-05 NOTE — ASSESSMENT & PLAN NOTE
Blood pressure reduced after rechecking it in office.  I will have our care manager reach out to patient to start working on nutritional changes to help with blood pressure and cholesterol.

## 2024-07-05 NOTE — LETTER
July 5, 2024    Patient: Brenda James  YOB: 1960  Date of Last Encounter: 3/29/2024      To whom it may concern:     Brenda James has tested positive for COVID-19 (Coronavirus). She may return to {RETURN TO WORK/SCHOOL:[id number]} on ***, which is 10 days from illness onset (provided symptoms are improving) and 24 hours without fever.    Sincerely,         Shraddha Amor

## 2024-07-05 NOTE — ASSESSMENT & PLAN NOTE
Will recheck lipid at annual visit in 6 months.  Will have patient work with care management to help reduce cholesterol levels.

## 2024-07-05 NOTE — PROGRESS NOTES
Ambulatory Visit  Name: Brenda James      : 1960      MRN: [id number]  Encounter Provider: Hector Ni DO  Encounter Date: 2024   Encounter department: [facility] IN PARTNERSHIP WITH [facility]    Assessment & Plan   1. Annual physical exam  -     POCT hemoglobin A1c  -     TSH, 3rd generation with Free T4 reflex  2. Colon cancer screening  Comments:  Has appointment with GI provider in October.  3. Attention deficit hyperactivity disorder (ADHD), predominantly inattentive type  Assessment & Plan:  Chronic and stable.  Continue Adderall 20 mg daily  4. Elevated BP without diagnosis of hypertension  Assessment & Plan:  Blood pressure reduced after rechecking it in office.  I will have our care manager reach out to patient to start working on nutritional changes to help with blood pressure and cholesterol.  5. Hyperlipidemia, unspecified hyperlipidemia type  Assessment & Plan:  Will recheck lipid at annual visit in 6 months.  Will have patient work with care management to help reduce cholesterol levels.  Orders:  -     POCT hemoglobin A1c  6. Depression, unspecified depression type  -     doxepin (SINEquan) 10 mg capsule; Take 1 capsule (10 mg total) by mouth daily at bedtime  7. Hypothyroidism, unspecified type  -     TSH, 3rd generation with Free T4 reflex  8. Obesity (BMI 30.0-34.9)  -     Ambulatory referral to complex care management program; Future  9. Primary insomnia  -     doxepin (SINEquan) 10 mg capsule; Take 1 capsule (10 mg total) by mouth daily at bedtime         Patient is a 63-year-old female who is presenting today for follow-up on chronic conditions.  See problem based charting as above for full plan of care.  I will be rechecking her TSH levels today to see if her thyroid has been stable.  I will follow-up with patient in 6 months or sooner if needed.  Patient was counseled on side effects of doxepin today and will use at bedtime to try and help  "with sleep.  She will reach out if there is any concerns with the medication.    History of Present Illness     HPI    Review of Systems   Constitutional:  Negative for fever.   Respiratory:  Negative for shortness of breath.    Cardiovascular:  Negative for chest pain.   Gastrointestinal:  Positive for diarrhea (chronic). Negative for abdominal pain.   Genitourinary:         Urinary bladder symptoms   Skin:  Negative for rash.   Psychiatric/Behavioral:  Positive for sleep disturbance.      Patient is presenting for follow-up on chronic conditions today.  She is currently having an evaluation by a orthopedic specialist for her hips.  She had an MRI that showed tendinitis in both hips.  Her exercise habits have been limited due to this.    Behavioral health counseling help the patient a lot with her symptoms of anxiety and stress towards her job.  This is improved but her sleep continues to be a problem.  The hydroxyzine did not help with her sleep symptoms.    She has a history of chronic diarrhea and will be seeing a GI specialist in October.  She is due for colonoscopy.    Objective     /76   Pulse 67   Ht 5' 3\" (1.6 m)   Wt 76.8 kg (169 lb 6.4 oz)   SpO2 95%   BMI 30.01 kg/m²     Physical Exam  Vitals and nursing note reviewed.   Constitutional:       General: She is not in acute distress.     Appearance: Normal appearance. She is well-developed. She is obese.   HENT:      Head: Normocephalic and atraumatic.      Right Ear: External ear normal.      Left Ear: External ear normal.      Nose: Nose normal.      Mouth/Throat:      Mouth: Mucous membranes are moist.   Eyes:      Extraocular Movements: Extraocular movements intact.      Conjunctiva/sclera: Conjunctivae normal.      Pupils: Pupils are equal, round, and reactive to light.   Cardiovascular:      Rate and Rhythm: Normal rate and regular rhythm.      Heart sounds: Normal heart sounds. No murmur heard.  Pulmonary:      Effort: Pulmonary effort is " normal. No respiratory distress.      Breath sounds: Normal breath sounds. No wheezing.   Musculoskeletal:         General: Normal range of motion.      Cervical back: Normal range of motion.   Skin:     General: Skin is warm and dry.      Findings: No rash.   Neurological:      General: No focal deficit present.      Mental Status: She is alert and oriented to person, place, and time. Mental status is at baseline.   Psychiatric:         Mood and Affect: Mood normal.         Behavior: Behavior normal.         Thought Content: Thought content normal.         Judgment: Judgment normal.       Administrative Statements   I have spent a total time of 30 minutes in caring for this patient on the day of the visit/encounter including Instructions for management, Documenting in the medical record, and Obtaining or reviewing history  .

## 2024-07-06 LAB — TSH SERPL-ACNC: 1.02 MIU/L (ref 0.4–4.5)

## 2024-07-09 ENCOUNTER — PATIENT OUTREACH (OUTPATIENT)
Age: 64
End: 2024-07-09

## 2024-07-15 DIAGNOSIS — F90.0 ATTENTION DEFICIT HYPERACTIVITY DISORDER (ADHD), PREDOMINANTLY INATTENTIVE TYPE: ICD-10-CM

## 2024-07-15 RX ORDER — DEXTROAMPHETAMINE SACCHARATE, AMPHETAMINE ASPARTATE MONOHYDRATE, DEXTROAMPHETAMINE SULFATE AND AMPHETAMINE SULFATE 5; 5; 5; 5 MG/1; MG/1; MG/1; MG/1
20 CAPSULE, EXTENDED RELEASE ORAL EVERY MORNING
Qty: 30 CAPSULE | Refills: 0 | Status: SHIPPED | OUTPATIENT
Start: 2024-07-15

## 2024-07-23 ENCOUNTER — PATIENT OUTREACH (OUTPATIENT)
Age: 64
End: 2024-07-23

## 2024-07-23 NOTE — LETTER
Date: 07/23/24    Dear Brenda James,   My name is Nely; I am a registered nurse care manager working with [facility]   I have not been able to reach you and would like to set a time that I can talk with you over the phone or in-person.  My work is to help patients that have complex medical conditions get the care they need. This includes patients who may have been in the hospital or emergency room.    Please call me to schedule an initial outreach call or reply through [employer]. I look forward to speaking with you.   Sincerely,  Nely, MSN RN  [phone]  Outpatient Care Manager  Copy: Dr. Ni

## 2024-07-23 NOTE — PROGRESS NOTES
Chart Review. Patient did not respond to phone call or mychart outreach (read). Sent unable to reach letter. No further attempts will be made.

## 2024-07-30 ENCOUNTER — PATIENT OUTREACH (OUTPATIENT)
Age: 64
End: 2024-07-30

## 2024-07-30 NOTE — PROGRESS NOTES
Patient called in response to previous attempts to outreach. Left message. Returned call, patient had high cholesterol as of march but repeat labs were not done. She was not placed on medication and feels that she always eats well. She is familiar with food journaling using an britni. She has always struggled with weight and was only able to lose significantly in the past with phentermine. We did briefly discuss GLP's as an option in the distant future. Initial outreach scheduled 7/31

## 2024-07-31 ENCOUNTER — PATIENT OUTREACH (OUTPATIENT)
Dept: FAMILY MEDICINE CLINIC | Facility: CLINIC | Age: 64
End: 2024-07-31

## 2024-07-31 NOTE — PROGRESS NOTES
Spoke with patient during scheduled outreach. She gave me a brief history yesterday about her past successes. We started today with hydration. She drinks 2c of coffee, 48 oz of water and a la croix. She tries to limit wine to special occasions. Hydration protocol explained. Goal is to be hydrated until next outreach. Patient verbalized understanding. We also discussed the science of sodium and water within the body. Next outreach 8/7.

## 2024-08-07 ENCOUNTER — PATIENT OUTREACH (OUTPATIENT)
Dept: FAMILY MEDICINE CLINIC | Facility: CLINIC | Age: 64
End: 2024-08-07

## 2024-08-07 NOTE — PROGRESS NOTES
Spoke with patient during scheduled outreach. She did well with the water. Explained process of calorie tracking and requested that she send her calories for review and response on 8/12. Next phone outreach 8/27

## 2024-08-13 ENCOUNTER — SOCIAL WORK (OUTPATIENT)
Age: 64
End: 2024-08-13

## 2024-08-13 ENCOUNTER — PATIENT OUTREACH (OUTPATIENT)
Age: 64
End: 2024-08-13

## 2024-08-13 DIAGNOSIS — F32.A DEPRESSION, UNSPECIFIED DEPRESSION TYPE: ICD-10-CM

## 2024-08-13 DIAGNOSIS — F41.9 ANXIETY: Primary | ICD-10-CM

## 2024-08-13 PROCEDURE — 90834 PSYTX W PT 45 MINUTES: CPT

## 2024-08-13 NOTE — PROGRESS NOTES
Patient sent calories for review and response. Suggested a starting calorie goal of [id number]. Next phone outreach 8/27

## 2024-08-13 NOTE — PSYCH
"Behavioral Health Psychotherapy Progress Note    Psychotherapy Provided: Individual Psychotherapy     1. Anxiety        2. Depression, unspecified depression type            Goals addressed in session: Goal 1     DATA: Explored distortions and helped patient begin to stop judging, resisting and labeling her emotions to be able to explore them and figure out what is underneath them. Explored and identified primary and secondary emotions. Normalized and validated feelings. Worked with patient to understand what she is feeling, explore why she is feeling this way and then choose what she wants to do about it. Worked with patient on vulnerability and how to feel and process the primary emotions rather than the secondary emotions. Worked with patient to mindfully make room for her primary emotions and explore what it has to teach her.     During this session, this clinician used the following therapeutic modalities: Cognitive Behavioral Therapy, Mindfulness-based Strategies, and Supportive Psychotherapy    Substance Abuse was not addressed during this session. If the client is diagnosed with a co-occurring substance use disorder, please indicate any changes in the frequency or amount of use: n/a. Stage of change for addressing substance use diagnoses: No substance use/Not applicable    ASSESSMENT:  Brenda James presents with a Euthymic/ normal mood.     her affect is Normal range and intensity, which is congruent, with her mood and the content of the session. The client has made progress on their goals.     Brenda James presents with a none risk of suicide, none risk of self-harm, and none risk of harm to others.    For any risk assessment that surpasses a \"low\" rating, a safety plan must be developed.    A safety plan was indicated: no  If yes, describe in detail n/a    PLAN: Between sessions, Brenda James will continue to use coping strategies to manage anxiety and depression. At the next session, the therapist will " use Cognitive Behavioral Therapy, Mindfulness-based Strategies, and Supportive Psychotherapy to address anxiety and depression.    Behavioral Health Treatment Plan and Discharge Planning: Brenda James is aware of and agrees to continue to work on their treatment plan. They have identified and are working toward their discharge goals. yes    Visit start and stop times:    08/13/24  Start Time: 0908  Stop Time: 1000  Total Visit Time: 52 minutes

## 2024-08-14 ENCOUNTER — PATIENT OUTREACH (OUTPATIENT)
Dept: FAMILY MEDICINE CLINIC | Facility: CLINIC | Age: 64
End: 2024-08-14

## 2024-08-16 DIAGNOSIS — F90.0 ATTENTION DEFICIT HYPERACTIVITY DISORDER (ADHD), PREDOMINANTLY INATTENTIVE TYPE: ICD-10-CM

## 2024-08-16 RX ORDER — DEXTROAMPHETAMINE SACCHARATE, AMPHETAMINE ASPARTATE MONOHYDRATE, DEXTROAMPHETAMINE SULFATE AND AMPHETAMINE SULFATE 5; 5; 5; 5 MG/1; MG/1; MG/1; MG/1
20 CAPSULE, EXTENDED RELEASE ORAL EVERY MORNING
Qty: 30 CAPSULE | Refills: 0 | Status: SHIPPED | OUTPATIENT
Start: 2024-08-16

## 2024-08-16 NOTE — TELEPHONE ENCOUNTER
Voicemail left by PT:    Hi, this is Delfina James. My birthday is [birth year] and I'm calling for to refill my prescription of Adderall, a 30 day supply and 20 milligrams. My pharmacy is the [facility] on [address]. Thank you very much. Ella madrigal.

## 2024-08-27 ENCOUNTER — PATIENT OUTREACH (OUTPATIENT)
Age: 64
End: 2024-08-27

## 2024-08-27 NOTE — PROGRESS NOTES
Spoke with patient during scheduled outreach. She did well with the 1431-2302 goal so we discussed macros. She is falling short on water a little. She does take her BP at times. I asked that she take it 3 times a week and she can submit through [employer]. Patient overall is doing well so we pushed her next outreach into October. Patient verbalized understanding. Next outreach 10/9

## 2024-09-26 DIAGNOSIS — F90.0 ATTENTION DEFICIT HYPERACTIVITY DISORDER (ADHD), PREDOMINANTLY INATTENTIVE TYPE: ICD-10-CM

## 2024-09-26 RX ORDER — DEXTROAMPHETAMINE SACCHARATE, AMPHETAMINE ASPARTATE MONOHYDRATE, DEXTROAMPHETAMINE SULFATE AND AMPHETAMINE SULFATE 5; 5; 5; 5 MG/1; MG/1; MG/1; MG/1
20 CAPSULE, EXTENDED RELEASE ORAL EVERY MORNING
Qty: 30 CAPSULE | Refills: 0 | Status: SHIPPED | OUTPATIENT
Start: 2024-09-26

## 2024-10-09 ENCOUNTER — PATIENT OUTREACH (OUTPATIENT)
Dept: FAMILY MEDICINE CLINIC | Facility: CLINIC | Age: 64
End: 2024-10-09

## 2024-10-09 NOTE — PROGRESS NOTES
Spoke with patient during scheduled outreach. 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 She actually ate less that she should have been each day resulting in feeling lethargic and run down. Talked about ways to have a protein breakfast on the go and use shake during times when she can't eat along with a balance lunch and dinner. Patient verbalized understanding. Next phone outreach 11/7

## 2024-10-10 ENCOUNTER — SOCIAL WORK (OUTPATIENT)
Age: 64
End: 2024-10-10

## 2024-10-10 DIAGNOSIS — F32.A DEPRESSION, UNSPECIFIED DEPRESSION TYPE: ICD-10-CM

## 2024-10-10 DIAGNOSIS — F41.9 ANXIETY: Primary | ICD-10-CM

## 2024-10-10 PROCEDURE — 90834 PSYTX W PT 45 MINUTES: CPT

## 2024-10-10 NOTE — PSYCH
"Behavioral Health Psychotherapy Progress Note    Psychotherapy Provided: Individual Psychotherapy     1. Anxiety        2. Depression, unspecified depression type            Goals addressed in session: Goal 1     DATA: Worked with patient to continue reducing the frequency, intensity and duration of her anxiety. Worked with patient to continue resolving the core conflicts that are the source of her anxiety. Continued to work with patient to enhance her ability to effectively cope with a variety of life's anxieties.     During this session, this clinician used the following therapeutic modalities: Cognitive Behavioral Therapy, Mindfulness-based Strategies, and Supportive Psychotherapy    Substance Abuse was not addressed during this session. If the client is diagnosed with a co-occurring substance use disorder, please indicate any changes in the frequency or amount of use: n/a. Stage of change for addressing substance use diagnoses: No substance use/Not applicable    ASSESSMENT:  Brenda James presents with a Euthymic/ normal mood.     her affect is Normal range and intensity, which is congruent, with her mood and the content of the session. The client has made progress on their goals.     Brenda James presents with a none risk of suicide, none risk of self-harm, and none risk of harm to others.    For any risk assessment that surpasses a \"low\" rating, a safety plan must be developed.    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 At the next session, the therapist will use Cognitive Behavioral Therapy, Mindfulness-based Strategies, and Supportive Psychotherapy to address anxiety and where she is in the process of her decision.    Behavioral " Health Treatment Plan and Discharge Planning: Brenda James is aware of and agrees to continue to work on their treatment plan. They have identified and are working toward their discharge goals. yes    Visit start and stop times:    10/10/24  Start Time: 1500  Stop Time: 1552  Total Visit Time: 52 minutes

## 2024-10-10 NOTE — BH TREATMENT PLAN
Outpatient Behavioral Health Psychotherapy Treatment Plan    Brenda James  1960     Date of Initial Psychotherapy Assessment: 04/8/24   Date of Current Treatment Plan: 10/10/24  Treatment Plan Target Date: 04/10/25  Treatment Plan Expiration Date: 4/10/25    Diagnosis:   1. Anxiety        2. Depression, unspecified depression type            Area(s) of Need: Anxiety, coping strategies, emotional regulation, negative/what if thoughts    Long Term Goal 1 (in the client's own words): To understand myself better and identify symptoms, triggers and unhelpful coping strategies as well as experience a reduction in anxiety symptoms and their severity so life and transitions feel more manageable. In addition, I want to use healthier coping skills more often.      Stage of Change: Action    Target Date for completion: n/a     Anticipated therapeutic modalities: Cognitive behavioral therapy, supportive psychotherapy, mindfulness/relaxation practices      People identified to complete this goal: Patient, therapist      Objective 1: (identify the means of measuring success in meeting the objective): Patient will be able to demonstrate at least 65% reduction in anxiety as well as improve mood by 50%, with the use of at least 2-3 learned healthier methods of coping each week and continued talk-therapy, as self-identified and reported        Objective 2: (identify the means of measuring success in meeting the objective): Patient will be able to better understand her emotions and where they are coming from and address root causes of stress and difficult emotions.       Long Term Goal 2 (in the client's own words): n/a    Stage of Change: n/a    Target Date for completion: n/a     Anticipated therapeutic modalities: n/a     People identified to complete this goal: n/a      Objective 1: (identify the means of measuring success in meeting the objective): n/a      Objective 2: (identify the means of measuring success in meeting the  objective): n/a      I am currently under the care of a [facility] psychiatric provider: no    My [facility] psychiatric provider is: n/a    I am currently taking psychiatric medications: Yes, as prescribed    I feel that I will be ready for discharge from mental health care when I reach the following (measurable goal/objective): Goals met    For children and adults who have a legal guardian:   Has there been any change to custody orders and/or guardianship status? NA. If yes, attach updated documentation.    I have created my Crisis Plan and have been offered a copy of this plan    Behavioral Health Treatment Plan [facility]: Diagnosis and Treatment Plan explained to Brenda James acknowledges an understanding of their diagnosis. Brenda James agrees to this treatment plan.    I have been offered a copy of this Treatment Plan. yes

## 2024-10-30 DIAGNOSIS — F90.0 ATTENTION DEFICIT HYPERACTIVITY DISORDER (ADHD), PREDOMINANTLY INATTENTIVE TYPE: ICD-10-CM

## 2024-10-30 RX ORDER — DEXTROAMPHETAMINE SACCHARATE, AMPHETAMINE ASPARTATE MONOHYDRATE, DEXTROAMPHETAMINE SULFATE AND AMPHETAMINE SULFATE 5; 5; 5; 5 MG/1; MG/1; MG/1; MG/1
20 CAPSULE, EXTENDED RELEASE ORAL EVERY MORNING
Qty: 30 CAPSULE | Refills: 0 | Status: SHIPPED | OUTPATIENT
Start: 2024-10-30

## 2024-11-07 ENCOUNTER — PATIENT OUTREACH (OUTPATIENT)
Dept: FAMILY MEDICINE CLINIC | Facility: CLINIC | Age: 64
End: 2024-11-07

## 2024-11-07 NOTE — PROGRESS NOTES
Spoke with patient during scheduled outreach. She is doing really well with her goals and losing slowly. She is not interested in medication and has been happy with the pace of the program. Closing case for now, but let patient know if she needs further encouragement to just reach out.

## 2024-12-04 DIAGNOSIS — F90.0 ATTENTION DEFICIT HYPERACTIVITY DISORDER (ADHD), PREDOMINANTLY INATTENTIVE TYPE: ICD-10-CM

## 2024-12-04 RX ORDER — DEXTROAMPHETAMINE SACCHARATE, AMPHETAMINE ASPARTATE MONOHYDRATE, DEXTROAMPHETAMINE SULFATE AND AMPHETAMINE SULFATE 5; 5; 5; 5 MG/1; MG/1; MG/1; MG/1
20 CAPSULE, EXTENDED RELEASE ORAL EVERY MORNING
Qty: 30 CAPSULE | Refills: 0 | Status: SHIPPED | OUTPATIENT
Start: 2024-12-04

## 2025-01-05 NOTE — PROGRESS NOTES
Adult Annual Physical  Name: Brenda James      : 1960      MRN: [id number]  Encounter Provider: Hector iN DO  Encounter Date: 1/15/2025   Encounter department: [facility] IN PARTNERSHIP WITH [facility]    Assessment & Plan  Annual physical exam    Orders:  •  CBC and differential; Future  •  Comprehensive metabolic panel; Future  •  Lipid Panel with Direct LDL reflex; Future  •  POCT hemoglobin A1c    Encounter for vaccination  No vaccines given.       Hyperlipidemia, unspecified hyperlipidemia type  Cholesterol levels were significantly elevated at last cholesterol check.  We will repeat fasting cholesterol levels and may consider cholesterol-lowering medication if not improved.  Orders:  •  CBC and differential; Future  •  Comprehensive metabolic panel; Future  •  Lipid Panel with Direct LDL reflex; Future  •  POCT hemoglobin A1c    Mild intermittent asthma without complication  Chronic and stable.  Continue current regimen of medications per specialist       Hypothyroidism, unspecified type  Will recheck TSH and T3 levels.  Orders:  •  TSH, 3rd generation with Free T4 reflex; Future  •  T3, free; Future  •  levothyroxine 112 mcg tablet; Take 1 tablet (112 mcg total) by mouth daily    Anxiety  Chronic and stable.  Continue following with behavioral health counselor as needed.       Attention deficit hyperactivity disorder (ADHD), predominantly inattentive type  Patient is doing well on 20 mg dose of Adderall.  Refill given today.  Will do drug contract and urine drug screen at next visit  Orders:  •  amphetamine-dextroamphetamine (ADDERALL XR, 20MG,) 20 MG 24 hr capsule; Take 1 capsule (20 mg total) by mouth every morning Max Daily Amount: 20 mg    Primary insomnia  Patient has been working on healthy sleep habits at home.  The doxepin made her too drowsy so she stopped taking that medication.  Currently she is having issues with getting to sleep.  I discussed  with her that ramelteon would be an option that we can try to help with sleep.  Will send in a prescription for ramelteon to use as needed.  Will also check lab levels for hormonal imbalances.  Orders:  •  ramelteon (ROZEREM) 8 mg tablet; Take 1 tablet (8 mg total) by mouth daily as needed for sleep  •  Estrogens, total; Future  •  Progesterone; Future  •  Testosterone, free, total; Future    Bilateral hip pain  Recommended to continue with anti-inflammatories as needed.  She follows with an orthopedic specialist and may require another injection if it is not getting better.  I will give her a list of exercises that she can do at home to help.       Chronic fatigue    Orders:  •  Estrogens, total; Future  •  Progesterone; Future  •  Testosterone, free, total; Future    Immunizations and preventive care screenings were discussed with patient today. Appropriate education was printed on patient's after visit summary.    Counseling:  Dental Health: discussed importance of regular tooth brushing, flossing, and dental visits.  Exercise: the importance of regular exercise/physical activity was discussed. Recommend exercise 3-5 times per week for at least 30 minutes.     BMI Counseling: Body mass index is 29.76 kg/m². The BMI is above normal. Nutrition recommendations include encouraging healthy choices of fruits and vegetables. Exercise recommendations include exercising 3-5 times per week. Rationale for BMI follow-up plan is due to patient being overweight or obese.       Patient was seen today for an annual physical exam. Age appropriate screening tests were done as above. Annual labs were ordered to be done through [facility]. Patient will be contacted regarding results and follow up will be based on findings. Patient was counseled on the importance of proper diet and exercise. I recommend diets rich in lean meats and leafy green vegetables. Try to have 150 minutes of exercise weekly at moderate intensity. See problem  "based charting for any chronic problems or new findings and current workup/management.    40 minutes were spent on chart review, examination, and plan of care. This note was dictated using software.     History of Present Illness     Adult Annual Physical:  Patient presents for annual physical.     Diet and Physical Activity:  - Diet/Nutrition: well balanced diet.  - Exercise: walking and moderate cardiovascular exercise.    General Health:  - Sleep: sleeps poorly.  - Hearing: normal hearing bilateral ears.  - Vision: no vision problems and wears glasses.  - Dental: regular dental visits.    Review of Systems   Constitutional:  Negative for fever.   Respiratory:  Negative for shortness of breath.    Cardiovascular:  Negative for chest pain.   Gastrointestinal:  Negative for abdominal pain, constipation and diarrhea.   Genitourinary:  Negative for difficulty urinating.   Musculoskeletal:  Positive for arthralgias.   Skin:  Negative for rash.   Psychiatric/Behavioral:  Positive for sleep disturbance. Negative for decreased concentration.      Patient would like to discuss pain in her gluteal area as she has a history of tendinitis.  She would also like to discuss problems with her sleep as she is now having problems getting to sleep.    Objective   /82 (BP Location: Left arm, Patient Position: Sitting, Cuff Size: Large)   Pulse 84   Temp 97.9 °F (36.6 °C) (Oral)   Ht 5' 3\" (1.6 m)   Wt 76.2 kg (168 lb)   SpO2 97%   BMI 29.76 kg/m²     Physical Exam  Vitals and nursing note reviewed.   Constitutional:       General: She is not in acute distress.     Appearance: Normal appearance. She is well-developed.   HENT:      Head: Normocephalic and atraumatic.      Right Ear: Tympanic membrane, ear canal and external ear normal.      Left Ear: Tympanic membrane, ear canal and external ear normal.      Nose: Nose normal. No congestion.      Mouth/Throat:      Mouth: Mucous membranes are moist.      Pharynx: No " oropharyngeal exudate or posterior oropharyngeal erythema.   Eyes:      Conjunctiva/sclera: Conjunctivae normal.   Cardiovascular:      Rate and Rhythm: Normal rate and regular rhythm.      Heart sounds: Normal heart sounds. No murmur heard.  Pulmonary:      Effort: Pulmonary effort is normal. No respiratory distress.      Breath sounds: Normal breath sounds. No wheezing.   Abdominal:      General: Abdomen is flat.      Palpations: Abdomen is soft.      Tenderness: There is no abdominal tenderness. There is no guarding.   Musculoskeletal:         General: Normal range of motion.      Cervical back: Neck supple.   Lymphadenopathy:      Cervical: No cervical adenopathy.   Skin:     General: Skin is warm and dry.      Findings: No rash.   Neurological:      General: No focal deficit present.      Mental Status: She is alert and oriented to person, place, and time. Mental status is at baseline.   Psychiatric:         Mood and Affect: Mood normal.         Behavior: Behavior normal.         Thought Content: Thought content normal.         Judgment: Judgment normal.

## 2025-01-05 NOTE — ASSESSMENT & PLAN NOTE
Will recheck TSH and T3 levels.  Orders:    TSH, 3rd generation with Free T4 reflex; Future    T3, free; Future    levothyroxine 112 mcg tablet; Take 1 tablet (112 mcg total) by mouth daily

## 2025-01-05 NOTE — ASSESSMENT & PLAN NOTE
Patient is doing well on 20 mg dose of Adderall.  Refill given today.  Will do drug contract and urine drug screen at next visit  Orders:    amphetamine-dextroamphetamine (ADDERALL XR, 20MG,) 20 MG 24 hr capsule; Take 1 capsule (20 mg total) by mouth every morning Max Daily Amount: 20 mg

## 2025-01-05 NOTE — ASSESSMENT & PLAN NOTE
Cholesterol levels were significantly elevated at last cholesterol check.  We will repeat fasting cholesterol levels and may consider cholesterol-lowering medication if not improved.  Orders:    CBC and differential; Future    Comprehensive metabolic panel; Future    Lipid Panel with Direct LDL reflex; Future    POCT hemoglobin A1c

## 2025-01-10 ENCOUNTER — RA CDI HCC (OUTPATIENT)
Dept: OTHER | Facility: HOSPITAL | Age: 65
End: 2025-01-10

## 2025-01-10 ENCOUNTER — TELEPHONE (OUTPATIENT)
Dept: ADMINISTRATIVE | Facility: OTHER | Age: 65
End: 2025-01-10

## 2025-01-10 NOTE — TELEPHONE ENCOUNTER
Upon review of the In Basket request we  FOUND HM ALREADY UPDATED     Any additional questions or concerns should be emailed to the Practice Liaisons via the appropriate education email address, please do not reply via In Basket.    Thank you  Katt Guerin MA   [facility]

## 2025-01-10 NOTE — TELEPHONE ENCOUNTER
----- Message from Shraddha LAGUERRE sent at 1/10/2025  8:13 AM EST -----  Regarding: Quality Update  01/10/25 8:13 AM    Hello, our patient Brenda James has had Mammogram completed/performed. Please assist in updating the patient chart by pulling the Care Everywhere (CE) document. The date of service is 04/11/2024.     Thank you,  ROSSANA Varghese PG [facility]

## 2025-01-15 ENCOUNTER — OFFICE VISIT (OUTPATIENT)
Dept: FAMILY MEDICINE CLINIC | Facility: CLINIC | Age: 65
End: 2025-01-15

## 2025-01-15 ENCOUNTER — SOCIAL WORK (OUTPATIENT)
Age: 65
End: 2025-01-15

## 2025-01-15 VITALS
BODY MASS INDEX: 29.77 KG/M2 | DIASTOLIC BLOOD PRESSURE: 82 MMHG | TEMPERATURE: 97.9 F | HEIGHT: 63 IN | OXYGEN SATURATION: 97 % | SYSTOLIC BLOOD PRESSURE: 122 MMHG | WEIGHT: 168 LBS | HEART RATE: 84 BPM

## 2025-01-15 DIAGNOSIS — E03.9 HYPOTHYROIDISM, UNSPECIFIED TYPE: ICD-10-CM

## 2025-01-15 DIAGNOSIS — Z23 ENCOUNTER FOR VACCINATION: ICD-10-CM

## 2025-01-15 DIAGNOSIS — Z00.00 ANNUAL PHYSICAL EXAM: Primary | ICD-10-CM

## 2025-01-15 DIAGNOSIS — F41.9 ANXIETY: Primary | ICD-10-CM

## 2025-01-15 DIAGNOSIS — F32.A DEPRESSION, UNSPECIFIED DEPRESSION TYPE: ICD-10-CM

## 2025-01-15 DIAGNOSIS — E78.5 HYPERLIPIDEMIA, UNSPECIFIED HYPERLIPIDEMIA TYPE: ICD-10-CM

## 2025-01-15 DIAGNOSIS — F90.0 ATTENTION DEFICIT HYPERACTIVITY DISORDER (ADHD), PREDOMINANTLY INATTENTIVE TYPE: ICD-10-CM

## 2025-01-15 DIAGNOSIS — F51.01 PRIMARY INSOMNIA: ICD-10-CM

## 2025-01-15 DIAGNOSIS — J45.20 MILD INTERMITTENT ASTHMA WITHOUT COMPLICATION: ICD-10-CM

## 2025-01-15 DIAGNOSIS — M25.552 BILATERAL HIP PAIN: ICD-10-CM

## 2025-01-15 DIAGNOSIS — M25.551 BILATERAL HIP PAIN: ICD-10-CM

## 2025-01-15 DIAGNOSIS — R53.82 CHRONIC FATIGUE: ICD-10-CM

## 2025-01-15 DIAGNOSIS — F41.9 ANXIETY: ICD-10-CM

## 2025-01-15 LAB — SL AMB POCT HEMOGLOBIN AIC: 5.7 (ref ?–6.5)

## 2025-01-15 PROCEDURE — 99213 OFFICE O/P EST LOW 20 MIN: CPT | Performed by: STUDENT IN AN ORGANIZED HEALTH CARE EDUCATION/TRAINING PROGRAM

## 2025-01-15 PROCEDURE — 83036 HEMOGLOBIN GLYCOSYLATED A1C: CPT | Performed by: STUDENT IN AN ORGANIZED HEALTH CARE EDUCATION/TRAINING PROGRAM

## 2025-01-15 PROCEDURE — 90834 PSYTX W PT 45 MINUTES: CPT

## 2025-01-15 PROCEDURE — 99396 PREV VISIT EST AGE 40-64: CPT | Performed by: STUDENT IN AN ORGANIZED HEALTH CARE EDUCATION/TRAINING PROGRAM

## 2025-01-15 RX ORDER — DEXTROAMPHETAMINE SACCHARATE, AMPHETAMINE ASPARTATE MONOHYDRATE, DEXTROAMPHETAMINE SULFATE AND AMPHETAMINE SULFATE 5; 5; 5; 5 MG/1; MG/1; MG/1; MG/1
20 CAPSULE, EXTENDED RELEASE ORAL EVERY MORNING
Qty: 30 CAPSULE | Refills: 0 | Status: SHIPPED | OUTPATIENT
Start: 2025-01-15

## 2025-01-15 RX ORDER — LEVOTHYROXINE SODIUM 112 UG/1
112 TABLET ORAL DAILY
Qty: 90 TABLET | Refills: 3 | Status: SHIPPED | OUTPATIENT
Start: 2025-01-15 | End: 2026-12-09

## 2025-01-15 RX ORDER — RAMELTEON 8 MG/1
8 TABLET ORAL DAILY PRN
Qty: 30 TABLET | Refills: 5 | Status: SHIPPED | OUTPATIENT
Start: 2025-01-15

## 2025-01-15 NOTE — PSYCH
"Behavioral Health Psychotherapy Progress Note    Psychotherapy Provided: Individual Psychotherapy     1. Anxiety        2. Depression, unspecified depression type            Goals addressed in session: Goal 1     DATA: Worked with patient this session how she can accept accountability of her own emotions and not try to take them on for someone else. Worked with patient to help her recognize that when she\"owns someone's emotions\", her anxiety symptoms worsen. Worked with patient to be more present with someone without thinking she needs to solve or fix anything for them. Worked with patient so she can remain present and listen, and not focus on removing others' discomfort as the goal. Worked with patient to begin exploring her own unfinished emotional issues, stressors and difficulties she has experienced that continue to take up space in her mind and affect present relationships. Allowed for a safe space for patient to begin recognizing root causes and working through the emotions.    During this session, this clinician used the following therapeutic modalities: Cognitive Behavioral Therapy, Mindfulness-based Strategies, and Supportive Psychotherapy    Substance Abuse was not addressed during this session. If the client is diagnosed with a co-occurring substance use disorder, please indicate any changes in the frequency or amount of use: n/a. Stage of change for addressing substance use diagnoses: No substance use/Not applicable    ASSESSMENT:  Brenda James presents with a Euthymic/ normal mood.     her affect is Normal range and intensity, which is congruent, with her mood and the content of the session. The client has made progress on their goals.     Brenda James presents with a none risk of suicide, none risk of self-harm, and none risk of harm to others.    For any risk assessment that surpasses a \"low\" rating, a safety plan must be developed.    A safety plan was indicated: no  If yes, describe in detail " n/a    PLAN: Between sessions, Brenda James will continue to use coping strategies discussed to manage anxiety and depression. At the next session, the therapist will use Cognitive Behavioral Therapy, Mindfulness-based Strategies, and Supportive Psychotherapy to address anxiety and depression and job transitions.    Behavioral Health Treatment Plan and Discharge Planning: Brenda James is aware of and agrees to continue to work on their treatment plan. They have identified and are working toward their discharge goals. yes    Depression Follow-up Plan Completed: Not applicable    Visit start and stop times:    01/15/25  Start Time: 1510  Stop Time: 1600  Total Visit Time: 50 minutes

## 2025-01-15 NOTE — ASSESSMENT & PLAN NOTE
Orders:    Estrogens, total; Future    Progesterone; Future    Testosterone, free, total; Future

## 2025-01-15 NOTE — ASSESSMENT & PLAN NOTE
Patient has been working on healthy sleep habits at home.  The doxepin made her too drowsy so she stopped taking that medication.  Currently she is having issues with getting to sleep.  I discussed with her that ramelteon would be an option that we can try to help with sleep.  Will send in a prescription for ramelteon to use as needed.  Will also check lab levels for hormonal imbalances.  Orders:    ramelteon (ROZEREM) 8 mg tablet; Take 1 tablet (8 mg total) by mouth daily as needed for sleep    Estrogens, total; Future    Progesterone; Future    Testosterone, free, total; Future

## 2025-02-07 DIAGNOSIS — F51.01 PRIMARY INSOMNIA: ICD-10-CM

## 2025-02-07 RX ORDER — RAMELTEON 8 MG/1
8 TABLET ORAL DAILY PRN
Qty: 90 TABLET | Refills: 2 | Status: SHIPPED | OUTPATIENT
Start: 2025-02-07

## 2025-02-27 DIAGNOSIS — F90.0 ATTENTION DEFICIT HYPERACTIVITY DISORDER (ADHD), PREDOMINANTLY INATTENTIVE TYPE: ICD-10-CM

## 2025-02-27 RX ORDER — DEXTROAMPHETAMINE SACCHARATE, AMPHETAMINE ASPARTATE MONOHYDRATE, DEXTROAMPHETAMINE SULFATE AND AMPHETAMINE SULFATE 5; 5; 5; 5 MG/1; MG/1; MG/1; MG/1
20 CAPSULE, EXTENDED RELEASE ORAL EVERY MORNING
Qty: 30 CAPSULE | Refills: 0 | Status: SHIPPED | OUTPATIENT
Start: 2025-02-27 | End: 2025-02-28 | Stop reason: SDUPTHER

## 2025-02-28 ENCOUNTER — TELEPHONE (OUTPATIENT)
Dept: FAMILY MEDICINE CLINIC | Facility: CLINIC | Age: 65
End: 2025-02-28

## 2025-02-28 DIAGNOSIS — F90.0 ATTENTION DEFICIT HYPERACTIVITY DISORDER (ADHD), PREDOMINANTLY INATTENTIVE TYPE: ICD-10-CM

## 2025-02-28 RX ORDER — DEXTROAMPHETAMINE SACCHARATE, AMPHETAMINE ASPARTATE MONOHYDRATE, DEXTROAMPHETAMINE SULFATE AND AMPHETAMINE SULFATE 5; 5; 5; 5 MG/1; MG/1; MG/1; MG/1
20 CAPSULE, EXTENDED RELEASE ORAL EVERY MORNING
Qty: 30 CAPSULE | Refills: 0 | Status: SHIPPED | OUTPATIENT
Start: 2025-02-28

## 2025-02-28 NOTE — TELEPHONE ENCOUNTER
Patient called in last night and left a message regarding her Adderall medication. She stated that [facility] did not have her medication on stock and so they had to order it. She stated she does not use the [facility] pharmacy anymore because of the often lack of inventory. She asks if prescription could be sent to [facility] in [facility]. Please advise.    Medication has been pended and awaiting signature.  LORRIE Reyes

## 2025-02-28 NOTE — TELEPHONE ENCOUNTER
Attempted to call patient to let her know her medication was sent to [facility] in [place], but I was unable to reach her. I left a message with details and asked her to call back if any questions.    LORRIE Reyes

## 2025-03-27 LAB
ALBUMIN SERPL-MCNC: 4.4 G/DL (ref 3.6–5.1)
ALBUMIN/GLOB SERPL: 1.6 (CALC) (ref 1–2.5)
ALP SERPL-CCNC: 80 U/L (ref 37–153)
ALT SERPL-CCNC: 12 U/L (ref 6–29)
AST SERPL-CCNC: 14 U/L (ref 10–35)
BASOPHILS # BLD AUTO: 38 CELLS/UL (ref 0–200)
BASOPHILS NFR BLD AUTO: 0.5 %
BILIRUB SERPL-MCNC: 0.5 MG/DL (ref 0.2–1.2)
BUN SERPL-MCNC: 20 MG/DL (ref 7–25)
BUN/CREAT SERPL: NORMAL (CALC) (ref 6–22)
CALCIUM SERPL-MCNC: 9.2 MG/DL (ref 8.6–10.4)
CHLORIDE SERPL-SCNC: 99 MMOL/L (ref 98–110)
CHOLEST SERPL-MCNC: 293 MG/DL
CHOLEST/HDLC SERPL: 4.1 (CALC)
CO2 SERPL-SCNC: 29 MMOL/L (ref 20–32)
CREAT SERPL-MCNC: 0.68 MG/DL (ref 0.5–1.05)
EOSINOPHIL # BLD AUTO: 137 CELLS/UL (ref 15–500)
EOSINOPHIL NFR BLD AUTO: 1.8 %
ERYTHROCYTE [DISTWIDTH] IN BLOOD BY AUTOMATED COUNT: 12.4 % (ref 11–15)
GFR/BSA.PRED SERPLBLD CYS-BASED-ARV: 98 ML/MIN/1.73M2
GLOBULIN SER CALC-MCNC: 2.7 G/DL (CALC) (ref 1.9–3.7)
GLUCOSE SERPL-MCNC: 80 MG/DL (ref 65–99)
HCT VFR BLD AUTO: 40.9 % (ref 35–45)
HDLC SERPL-MCNC: 71 MG/DL
HGB BLD-MCNC: 13 G/DL (ref 11.7–15.5)
LDLC SERPL CALC-MCNC: 204 MG/DL (CALC)
LYMPHOCYTES # BLD AUTO: 2379 CELLS/UL (ref 850–3900)
LYMPHOCYTES NFR BLD AUTO: 31.3 %
MCH RBC QN AUTO: 31.2 PG (ref 27–33)
MCHC RBC AUTO-ENTMCNC: 31.8 G/DL (ref 32–36)
MCV RBC AUTO: 98.1 FL (ref 80–100)
MONOCYTES # BLD AUTO: 403 CELLS/UL (ref 200–950)
MONOCYTES NFR BLD AUTO: 5.3 %
NEUTROPHILS # BLD AUTO: 4644 CELLS/UL (ref 1500–7800)
NEUTROPHILS NFR BLD AUTO: 61.1 %
NONHDLC SERPL-MCNC: 222 MG/DL (CALC)
PLATELET # BLD AUTO: 393 THOUSAND/UL (ref 140–400)
PMV BLD REES-ECKER: 10 FL (ref 7.5–12.5)
POTASSIUM SERPL-SCNC: 4.5 MMOL/L (ref 3.5–5.3)
PROGEST SERPL-MCNC: <0.5 NG/ML
PROT SERPL-MCNC: 7.1 G/DL (ref 6.1–8.1)
RBC # BLD AUTO: 4.17 MILLION/UL (ref 3.8–5.1)
SODIUM SERPL-SCNC: 136 MMOL/L (ref 135–146)
T3FREE SERPL-MCNC: 3.2 PG/ML (ref 2.3–4.2)
TRIGL SERPL-MCNC: 73 MG/DL
TSH SERPL-ACNC: 1.29 MIU/L (ref 0.4–4.5)
WBC # BLD AUTO: 7.6 THOUSAND/UL (ref 3.8–10.8)

## 2025-03-31 LAB
ALBUMIN SERPL-MCNC: 4.4 G/DL (ref 3.6–5.1)
ALBUMIN/GLOB SERPL: 1.6 (CALC) (ref 1–2.5)
ALP SERPL-CCNC: 80 U/L (ref 37–153)
ALT SERPL-CCNC: 12 U/L (ref 6–29)
AST SERPL-CCNC: 14 U/L (ref 10–35)
BASOPHILS # BLD AUTO: 38 CELLS/UL (ref 0–200)
BASOPHILS NFR BLD AUTO: 0.5 %
BILIRUB SERPL-MCNC: 0.5 MG/DL (ref 0.2–1.2)
BUN SERPL-MCNC: 20 MG/DL (ref 7–25)
BUN/CREAT SERPL: NORMAL (CALC) (ref 6–22)
CALCIUM SERPL-MCNC: 9.2 MG/DL (ref 8.6–10.4)
CHLORIDE SERPL-SCNC: 99 MMOL/L (ref 98–110)
CHOLEST SERPL-MCNC: 293 MG/DL
CHOLEST/HDLC SERPL: 4.1 (CALC)
CO2 SERPL-SCNC: 29 MMOL/L (ref 20–32)
CREAT SERPL-MCNC: 0.68 MG/DL (ref 0.5–1.05)
EOSINOPHIL # BLD AUTO: 137 CELLS/UL (ref 15–500)
EOSINOPHIL NFR BLD AUTO: 1.8 %
ERYTHROCYTE [DISTWIDTH] IN BLOOD BY AUTOMATED COUNT: 12.4 % (ref 11–15)
ESTROGEN SERPL-MCNC: 75 PG/ML
GFR/BSA.PRED SERPLBLD CYS-BASED-ARV: 98 ML/MIN/1.73M2
GLOBULIN SER CALC-MCNC: 2.7 G/DL (CALC) (ref 1.9–3.7)
GLUCOSE SERPL-MCNC: 80 MG/DL (ref 65–99)
HCT VFR BLD AUTO: 40.9 % (ref 35–45)
HDLC SERPL-MCNC: 71 MG/DL
HGB BLD-MCNC: 13 G/DL (ref 11.7–15.5)
LDLC SERPL CALC-MCNC: 204 MG/DL (CALC)
LYMPHOCYTES # BLD AUTO: 2379 CELLS/UL (ref 850–3900)
LYMPHOCYTES NFR BLD AUTO: 31.3 %
MCH RBC QN AUTO: 31.2 PG (ref 27–33)
MCHC RBC AUTO-ENTMCNC: 31.8 G/DL (ref 32–36)
MCV RBC AUTO: 98.1 FL (ref 80–100)
MONOCYTES # BLD AUTO: 403 CELLS/UL (ref 200–950)
MONOCYTES NFR BLD AUTO: 5.3 %
NEUTROPHILS # BLD AUTO: 4644 CELLS/UL (ref 1500–7800)
NEUTROPHILS NFR BLD AUTO: 61.1 %
NONHDLC SERPL-MCNC: 222 MG/DL (CALC)
PLATELET # BLD AUTO: 393 THOUSAND/UL (ref 140–400)
PMV BLD REES-ECKER: 10 FL (ref 7.5–12.5)
POTASSIUM SERPL-SCNC: 4.5 MMOL/L (ref 3.5–5.3)
PROGEST SERPL-MCNC: <0.5 NG/ML
PROT SERPL-MCNC: 7.1 G/DL (ref 6.1–8.1)
RBC # BLD AUTO: 4.17 MILLION/UL (ref 3.8–5.1)
SODIUM SERPL-SCNC: 136 MMOL/L (ref 135–146)
T3FREE SERPL-MCNC: 3.2 PG/ML (ref 2.3–4.2)
TESTOST FREE SERPL-MCNC: 1.3 PG/ML (ref 0.1–6.4)
TESTOST SERPL-MCNC: 16 NG/DL (ref 2–45)
TRIGL SERPL-MCNC: 73 MG/DL
TSH SERPL-ACNC: 1.29 MIU/L (ref 0.4–4.5)
WBC # BLD AUTO: 7.6 THOUSAND/UL (ref 3.8–10.8)

## 2025-04-01 ENCOUNTER — RESULTS FOLLOW-UP (OUTPATIENT)
Dept: FAMILY MEDICINE CLINIC | Facility: CLINIC | Age: 65
End: 2025-04-01

## 2025-04-01 DIAGNOSIS — F90.0 ATTENTION DEFICIT HYPERACTIVITY DISORDER (ADHD), PREDOMINANTLY INATTENTIVE TYPE: ICD-10-CM

## 2025-04-01 RX ORDER — DEXTROAMPHETAMINE SACCHARATE, AMPHETAMINE ASPARTATE MONOHYDRATE, DEXTROAMPHETAMINE SULFATE AND AMPHETAMINE SULFATE 5; 5; 5; 5 MG/1; MG/1; MG/1; MG/1
20 CAPSULE, EXTENDED RELEASE ORAL EVERY MORNING
Qty: 30 CAPSULE | Refills: 0 | Status: SHIPPED | OUTPATIENT
Start: 2025-04-01

## 2025-04-03 ENCOUNTER — SOCIAL WORK (OUTPATIENT)
Age: 65
End: 2025-04-03

## 2025-04-03 DIAGNOSIS — F41.9 ANXIETY: Primary | ICD-10-CM

## 2025-04-03 DIAGNOSIS — F32.A DEPRESSION, UNSPECIFIED DEPRESSION TYPE: ICD-10-CM

## 2025-04-03 PROCEDURE — 90834 PSYTX W PT 45 MINUTES: CPT

## 2025-04-03 NOTE — PSYCH
"Behavioral Health Psychotherapy Progress Note    Psychotherapy Provided: Individual Psychotherapy     1. Anxiety        2. Depression, unspecified depression type            Goals addressed in session: Goal 1     DATA: Worked with patient to continue reducing the frequency, intensity and duration of her anxiety. Worked with patient to continue resolving the core conflicts that are the source of her anxiety. Continued to work with patient to enhance her ability to effectively cope with a variety of life's anxieties.     During this session, this clinician used the following therapeutic modalities: Cognitive Behavioral Therapy, Mindfulness-based Strategies, and Supportive Psychotherapy    Substance Abuse was not addressed during this session. If the client is diagnosed with a co-occurring substance use disorder, please indicate any changes in the frequency or amount of use: n/a. Stage of change for addressing substance use diagnoses: No substance use/Not applicable    ASSESSMENT:  Brenda James presents with a Euthymic/ normal mood.     her affect is Normal range and intensity, which is congruent, with her mood and the content of the session. The client has made progress on their goals.     Brenda James presents with a none risk of suicide, none risk of self-harm, and none risk of harm to others.    For any risk assessment that surpasses a \"low\" rating, a safety plan must be developed.    A safety plan was indicated: no  If yes, describe in detail n/a    PLAN: Between sessions, Brenda James will continue to use coping strategies discussed to manage anxiety and depression. At the next session, the therapist will use Cognitive Behavioral Therapy, Mindfulness-based Strategies, and Supportive Psychotherapy to address barriers, redefine strategies for sustainable change and review progress since last session.       Behavioral Health Treatment Plan and Discharge Planning: Brenda James is aware of and agrees to continue to " work on their treatment plan. They have identified and are working toward their discharge goals. yes    Depression Follow-up Plan Completed: Not applicable    Visit start and stop times:    04/03/25  Start Time: 1503  Stop Time: 1555  Total Visit Time: 52 minutes

## 2025-04-03 NOTE — BH TREATMENT PLAN
Outpatient Behavioral Health Psychotherapy Treatment Plan    Brenda James  1960     Date of Initial Psychotherapy Assessment: 4/8/24   Date of Current Treatment Plan: 04/03/25  Treatment Plan Target Date: 10/3/25  Treatment Plan Expiration Date: 10/3/25    Diagnosis:   1. Anxiety        2. Depression, unspecified depression type            Area(s) of Need: Managing anxiety and depression, coping strategies, cognitive restructuring    Long Term Goal 1 (in the client's own words): To continue achieving sustained emotional well-being by continuing to develop effective coping strategies, improving self-awareness, and fostering resilience, leading to a fulfilling and balanced life with reduced symptoms of anxiety and depression    Stage of Change: Maintenance    Target Date for completion: n/a     Anticipated therapeutic modalities: Cognitive behavioral therapy, supportive psychotherapy, mindfulness/relaxation practices        People identified to complete this goal: Patient, therapist      Objective 1: (identify the means of measuring success in meeting the objective): Identify current maladaptive coping strategies and then apply/practice evidence-based coping strategies from the built toolbox of coping skills that have been effective at least 65% of the time, as self-reported by the patient.       Objective 2: (identify the means of measuring success in meeting the objective): n/a      Long Term Goal 2 (in the client's own words): n/a    Stage of Change: n/a    Target Date for completion: n/a     Anticipated therapeutic modalities: n/a     People identified to complete this goal: n/a      Objective 1: (identify the means of measuring success in meeting the objective): n/a      Objective 2: (identify the means of measuring success in meeting the objective): n/a     Long Term Goal 3 (in the client's own words): n/a     I am currently under the care of a [facility] psychiatric provider: no    My [facility] psychiatric  provider is: n/a    I am currently taking psychiatric medications: No    I feel that I will be ready for discharge from mental health care when I reach the following (measurable goal/objective): Goals met    For children and adults who have a legal guardian:   Has there been any change to custody orders and/or guardianship status? NA. If yes, attach updated documentation.    I have updated my Crisis Plan and have been offered a copy of this plan    Behavioral Health Treatment Plan [facility]: Diagnosis and Treatment Plan explained to Brenda James acknowledges an understanding of their diagnosis. Brenda James agrees to this treatment plan.    I have been offered a copy of this Treatment Plan. yes

## 2025-04-03 NOTE — BH CRISIS PLAN
Client Name: Brenda James       Client YOB: 1960    Sheela Safety Plan    Creation Date: 5/23/24 Update Date: 5/23/26   Created By: Connie Blandon LCSW       Step 1: Warning Signs:   Warning Signs   Overthinking /Spiraling negative thoughts   Heart racing   Feeling overemotional            Step 2: Internal Coping Strategies:   Internal Coping Strategies   Intentional breathing   Writing these down   Mindfulness / grounding techniques   Challenging negative thoughts            Step 3: People and social settings that provide distraction:   Name Contact Information   Doroteo James (spouse) [phone]          Step 4: People whom I can ask for help during a crisis:    Name Contact Information    Doroteo [phone]      Step 5: Professionals or agencies I can contact during a crisis:    Clinican/Agency Name Phone Emergency Contact    [facility] Crisis Line [phone]     [facility] [phone]     [facility] Emergency Department Emergency Department Phone Emergency Department Address    [facility] [phone] [address] [place] [address]      Crisis Phone Numbers:   Suicide Prevention Lifeline: Call or Text  842 Crisis Text Line: Text HOME to 796-383   Please note: Some [facility] do not have a separate number for Child/Adolescent specific crisis. If your county is not listed under Child/Adolescent, please call the adult number for your county      Adult Crisis Numbers: Child/Adolescent Crisis Numbers   [facility]: [phone] [facility]: [phone]   [facility]: [phone] [facility]: [phone]   [facility]: [phone] [place]: [phone]   [facility]: [phone] [place]: [phone]   [place]: [phone]   [facility]: [phone]   [facility]: [phone]   [facility]: [phone] (daytime) [phone] (after hours, weekends, holidays)      Step 6: Making  the environment safer (plan for lethal means safety):   Patient did not identify any lethal methods: Yes     Optional: What is most important to me and worth living for?   My family  My brother  by suicide and I would never want to do that to my loved ones     Sheela Safety Plan. Ynes Roman and Kendrick Grimm. Used with permission of the authors.

## 2025-05-02 DIAGNOSIS — F90.0 ATTENTION DEFICIT HYPERACTIVITY DISORDER (ADHD), PREDOMINANTLY INATTENTIVE TYPE: ICD-10-CM

## 2025-05-02 RX ORDER — DEXTROAMPHETAMINE SACCHARATE, AMPHETAMINE ASPARTATE MONOHYDRATE, DEXTROAMPHETAMINE SULFATE AND AMPHETAMINE SULFATE 5; 5; 5; 5 MG/1; MG/1; MG/1; MG/1
20 CAPSULE, EXTENDED RELEASE ORAL EVERY MORNING
Qty: 30 CAPSULE | Refills: 0 | Status: SHIPPED | OUTPATIENT
Start: 2025-05-02

## 2025-05-14 ENCOUNTER — SOCIAL WORK (OUTPATIENT)
Age: 65
End: 2025-05-14

## 2025-05-14 DIAGNOSIS — F41.9 ANXIETY: Primary | ICD-10-CM

## 2025-05-14 DIAGNOSIS — F32.A DEPRESSION, UNSPECIFIED DEPRESSION TYPE: ICD-10-CM

## 2025-05-14 PROCEDURE — 90834 PSYTX W PT 45 MINUTES: CPT

## 2025-05-14 NOTE — PSYCH
"Behavioral Health Psychotherapy Progress Note    Psychotherapy Provided: Individual Psychotherapy     1. Anxiety        2. Depression, unspecified depression type            Goals addressed in session: Goal 1     DATA: Worked with patient this session how she can accept accountability of her own emotions and not try to take them on for someone else. Worked with patient to help her recognize that when she\"owns someone's emotions\", her anxiety symptoms worsen. Worked with patient to be more present with someone without thinking she needs to solve or fix anything for them. Worked with patient so she can remain present and listen, and not focus on removing others' discomfort as the goal. Worked with patient to begin exploring her own unfinished emotional issues, stressors and difficulties she has experienced that continue to take up space in her mind and affect present relationships. Allowed for a safe space for patient to begin recognizing root causes and working through the emotions.    During this session, this clinician used the following therapeutic modalities: Cognitive Behavioral Therapy, Mindfulness-based Strategies, and Supportive Psychotherapy    Substance Abuse was not addressed during this session. If the client is diagnosed with a co-occurring substance use disorder, please indicate any changes in the frequency or amount of use: n/a. Stage of change for addressing substance use diagnoses: No substance use/Not applicable    ASSESSMENT:  Brenda James presents with a Euthymic/ normal mood.     her affect is Normal range and intensity, which is congruent, with her mood and the content of the session. The client has made progress on their goals.     Brenda James presents with a none risk of suicide, none risk of self-harm, and none risk of harm to others.    For any risk assessment that surpasses a \"low\" rating, a safety plan must be developed.    A safety plan was indicated: no  If yes, describe in detail " n/a    PLAN: Between sessions, Brenda James will continue to use coping strategies discussed to manage anxiety and depression.    At the next session, the therapist will use Cognitive Behavioral Therapy, Mindfulness-based Strategies, and Supportive Psychotherapy to address barriers, redefine strategies for sustainable change and review progress since last session.       Behavioral Health Treatment Plan and Discharge Planning: Brenda James is aware of and agrees to continue to work on their treatment plan. They have identified and are working toward their discharge goals. yes    Depression Follow-up Plan Completed: Not applicable    Visit start and stop times:    05/14/25  Start Time: 1400  Stop Time: 1452  Total Visit Time: 52 minutes

## 2025-06-16 DIAGNOSIS — F90.0 ATTENTION DEFICIT HYPERACTIVITY DISORDER (ADHD), PREDOMINANTLY INATTENTIVE TYPE: ICD-10-CM

## 2025-06-16 RX ORDER — DEXTROAMPHETAMINE SACCHARATE, AMPHETAMINE ASPARTATE MONOHYDRATE, DEXTROAMPHETAMINE SULFATE AND AMPHETAMINE SULFATE 5; 5; 5; 5 MG/1; MG/1; MG/1; MG/1
20 CAPSULE, EXTENDED RELEASE ORAL EVERY MORNING
Qty: 30 CAPSULE | Refills: 0 | Status: SHIPPED | OUTPATIENT
Start: 2025-06-16

## 2025-07-07 NOTE — ASSESSMENT & PLAN NOTE
We will recheck thyroid levels today.  Lab slip given with other testing.  Orders:    TSH, 3rd generation with Free T4 reflex; Future    T3, free; Future

## 2025-07-07 NOTE — PROGRESS NOTES
Name: Brenda James      : 1960      MRN: [id number]  Encounter Provider: Hector Ni DO  Encounter Date: 2025   Encounter department: [facility]  :  Assessment & Plan  Attention deficit hyperactivity disorder (ADHD), predominantly inattentive type  Chronic and stable on current dose of Adderall.  Continue 20 mg daily as needed.       Hypothyroidism, unspecified type  We will recheck thyroid levels today.  Lab slip given with other testing.  Orders:    TSH, 3rd generation with Free T4 reflex; Future    T3, free; Future    Primary insomnia  We will trial patient on Cymbalta to help treat pain symptoms in the hips and to see if this helps insomnia as well as it could be due to anxiety.  Will follow-up in 1 month to reassess.  Orders:    DULoxetine (CYMBALTA) 30 mg delayed release capsule; Take 1 capsule (30 mg total) by mouth daily    Chronic fatigue    Orders:    TSH, 3rd generation with Free T4 reflex; Future    ACTH; Future    Cortisol; Future    C-reactive protein; Future    Prolactin; Future    T3, free; Future    Bilateral hip pain  We will trial patient on Cymbalta to help treat pain symptoms in the hips and to see if this helps insomnia as well as it could be due to anxiety.  Will follow-up in 1 month to reassess.  Patient was counseled on side effects to monitor for with the medication  Orders:    DULoxetine (CYMBALTA) 30 mg delayed release capsule; Take 1 capsule (30 mg total) by mouth daily    Weight gain  Patient is concerned that she is having continued weight gain.  This may be due to decreased activity due to hip pain.  There are a few tests that we have not ruled out yet such as her adrenal glands and prolactin levels.  She is concerned that something might be going on causing this weight gain so I will rule out some other less common causes that have not been investigated yet.  Orders:    TSH, 3rd generation with Free T4  reflex; Future    ACTH; Future    Cortisol; Future    C-reactive protein; Future    Prolactin; Future    T3, free; Future             Patient is a 64-year-old female presenting today for follow-up on ADHD and hypothyroidism.  She is also having continued chronic fatigue and hip pain.  See problem-based charting as above for plan of care.    History of Present Illness   HPI  Review of Systems   Constitutional:  Positive for fatigue. Negative for fever.   Respiratory:  Negative for shortness of breath.    Cardiovascular:  Negative for chest pain.   Gastrointestinal:  Negative for abdominal pain, constipation and diarrhea.   Genitourinary:  Negative for difficulty urinating.   Musculoskeletal:  Positive for arthralgias.   Skin:  Negative for rash.   Psychiatric/Behavioral:  Positive for sleep disturbance.      Patient is a 64-year-old female presenting today for follow-up on ADHD and hypothyroidism.  ADHD is chronic and stable on current dose of Adderall.    Her main continued concerns are with her bilateral hip pain and chronic fatigue/insomnia.  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  She was put on meloxicam and is taking that once a day.  The ramelteon did not work to help with the sleep.  She continues to sleep fairly poorly and continues to feel fatigued.    Objective   /70 (BP Location: Right arm, Patient Position: Sitting, Cuff Size: Large)   Pulse 61   Temp 97.6 °F (36.4 °C) (Temporal)   Resp 18   Wt 79.2 kg (174 lb 9.6 oz)   SpO2 96%   BMI 30.93 kg/m²      Physical Exam  Vitals and nursing note reviewed.   Constitutional:       General: She is not in acute distress.     Appearance: Normal appearance. She is well-developed.   HENT:      Head: Normocephalic and atraumatic.      Right Ear: External ear normal.      Left Ear: External ear normal.      Nose: Nose normal.      Mouth/Throat:      Mouth: Mucous membranes are moist.     Eyes:       Conjunctiva/sclera: Conjunctivae normal.       Cardiovascular:      Rate and Rhythm: Normal rate and regular rhythm.   Pulmonary:      Effort: Pulmonary effort is normal. No respiratory distress.      Breath sounds: Normal breath sounds.     Skin:     General: Skin is warm and dry.      Findings: No rash.     Neurological:      General: No focal deficit present.      Mental Status: She is alert and oriented to person, place, and time. Mental status is at baseline.     Psychiatric:         Mood and Affect: Mood normal.         Behavior: Behavior normal.         Thought Content: Thought content normal.         Judgment: Judgment normal.       Administrative Statements   I have spent a total time of 20 minutes in caring for this patient on the day of the visit/encounter including Instructions for management, Documenting in the medical record, and Obtaining or reviewing history  .

## 2025-07-09 RX ORDER — MELOXICAM 15 MG/1
15 TABLET ORAL EVERY MORNING
COMMUNITY

## 2025-07-15 DIAGNOSIS — F90.0 ATTENTION DEFICIT HYPERACTIVITY DISORDER (ADHD), PREDOMINANTLY INATTENTIVE TYPE: ICD-10-CM

## 2025-07-15 RX ORDER — DEXTROAMPHETAMINE SACCHARATE, AMPHETAMINE ASPARTATE MONOHYDRATE, DEXTROAMPHETAMINE SULFATE AND AMPHETAMINE SULFATE 5; 5; 5; 5 MG/1; MG/1; MG/1; MG/1
20 CAPSULE, EXTENDED RELEASE ORAL EVERY MORNING
Qty: 30 CAPSULE | Refills: 0 | Status: SHIPPED | OUTPATIENT
Start: 2025-07-15

## 2025-07-17 ENCOUNTER — OFFICE VISIT (OUTPATIENT)
Age: 65
End: 2025-07-17

## 2025-07-17 VITALS
SYSTOLIC BLOOD PRESSURE: 110 MMHG | WEIGHT: 174.6 LBS | RESPIRATION RATE: 18 BRPM | HEART RATE: 61 BPM | DIASTOLIC BLOOD PRESSURE: 70 MMHG | OXYGEN SATURATION: 96 % | TEMPERATURE: 97.6 F | BODY MASS INDEX: 30.93 KG/M2

## 2025-07-17 DIAGNOSIS — E03.9 HYPOTHYROIDISM, UNSPECIFIED TYPE: ICD-10-CM

## 2025-07-17 DIAGNOSIS — F90.0 ATTENTION DEFICIT HYPERACTIVITY DISORDER (ADHD), PREDOMINANTLY INATTENTIVE TYPE: Primary | ICD-10-CM

## 2025-07-17 DIAGNOSIS — R53.82 CHRONIC FATIGUE: ICD-10-CM

## 2025-07-17 DIAGNOSIS — M25.551 BILATERAL HIP PAIN: ICD-10-CM

## 2025-07-17 DIAGNOSIS — M25.552 BILATERAL HIP PAIN: ICD-10-CM

## 2025-07-17 DIAGNOSIS — R63.5 WEIGHT GAIN: ICD-10-CM

## 2025-07-17 DIAGNOSIS — F51.01 PRIMARY INSOMNIA: ICD-10-CM

## 2025-07-17 PROCEDURE — 99213 OFFICE O/P EST LOW 20 MIN: CPT | Performed by: STUDENT IN AN ORGANIZED HEALTH CARE EDUCATION/TRAINING PROGRAM

## 2025-07-17 RX ORDER — DULOXETIN HYDROCHLORIDE 30 MG/1
30 CAPSULE, DELAYED RELEASE ORAL DAILY
Qty: 30 CAPSULE | Refills: 5 | Status: SHIPPED | OUTPATIENT
Start: 2025-07-17

## 2025-07-17 NOTE — ASSESSMENT & PLAN NOTE
We will trial patient on Cymbalta to help treat pain symptoms in the hips and to see if this helps insomnia as well as it could be due to anxiety.  Will follow-up in 1 month to reassess.  Orders:    DULoxetine (CYMBALTA) 30 mg delayed release capsule; Take 1 capsule (30 mg total) by mouth daily

## 2025-07-17 NOTE — ASSESSMENT & PLAN NOTE
Orders:    TSH, 3rd generation with Free T4 reflex; Future    ACTH; Future    Cortisol; Future    C-reactive protein; Future    Prolactin; Future    T3, free; Future

## 2025-07-27 LAB
ACTH PLAS-MCNC: 27 PG/ML (ref 6–50)
CORTIS SERPL-MCNC: 24.5 MCG/DL
CRP SERPL-MCNC: 8.1 MG/L
PROLACTIN SERPL-MCNC: 11.8 NG/ML
T3FREE SERPL-MCNC: 3 PG/ML (ref 2.3–4.2)
TSH SERPL-ACNC: 1.28 MIU/L (ref 0.4–4.5)

## 2025-08-21 DIAGNOSIS — F90.0 ATTENTION DEFICIT HYPERACTIVITY DISORDER (ADHD), PREDOMINANTLY INATTENTIVE TYPE: ICD-10-CM

## 2025-08-21 RX ORDER — DEXTROAMPHETAMINE SACCHARATE, AMPHETAMINE ASPARTATE MONOHYDRATE, DEXTROAMPHETAMINE SULFATE AND AMPHETAMINE SULFATE 5; 5; 5; 5 MG/1; MG/1; MG/1; MG/1
20 CAPSULE, EXTENDED RELEASE ORAL EVERY MORNING
Qty: 30 CAPSULE | Refills: 0 | Status: SHIPPED | OUTPATIENT
Start: 2025-08-21